# Patient Record
Sex: FEMALE | Race: WHITE | Employment: OTHER | URBAN - METROPOLITAN AREA
[De-identification: names, ages, dates, MRNs, and addresses within clinical notes are randomized per-mention and may not be internally consistent; named-entity substitution may affect disease eponyms.]

---

## 2017-04-25 ENCOUNTER — CLINICAL DOCUMENTATION (OUTPATIENT)
Dept: PHYSICAL MEDICINE AND REHAB | Age: 67
End: 2017-04-25

## 2017-05-16 ENCOUNTER — PROCEDURE VISIT (OUTPATIENT)
Dept: PHYSICAL MEDICINE AND REHAB | Age: 67
End: 2017-05-16
Payer: MEDICARE

## 2017-05-16 VITALS
HEIGHT: 60 IN | BODY MASS INDEX: 28.47 KG/M2 | WEIGHT: 145 LBS | DIASTOLIC BLOOD PRESSURE: 72 MMHG | SYSTOLIC BLOOD PRESSURE: 130 MMHG | TEMPERATURE: 97.6 F | HEART RATE: 78 BPM

## 2017-05-16 DIAGNOSIS — G81.14 SPASTIC HEMIPLEGIA AFFECTING LEFT NONDOMINANT SIDE (HCC): Primary | ICD-10-CM

## 2017-05-16 DIAGNOSIS — R26.9 GAIT ABNORMALITY: ICD-10-CM

## 2017-05-16 DIAGNOSIS — G93.40 ENCEPHALOPATHY: ICD-10-CM

## 2017-05-16 PROCEDURE — 64642 CHEMODENERV 1 EXTREMITY 1-4: CPT | Performed by: PHYSICAL MEDICINE & REHABILITATION

## 2017-05-16 PROCEDURE — 99214 OFFICE O/P EST MOD 30 MIN: CPT | Performed by: PHYSICAL MEDICINE & REHABILITATION

## 2017-05-16 PROCEDURE — 95874 GUIDE NERV DESTR NEEDLE EMG: CPT | Performed by: PHYSICAL MEDICINE & REHABILITATION

## 2017-05-16 RX ORDER — CLONAZEPAM 0.5 MG/1
0.5 TABLET ORAL 2 TIMES DAILY PRN
Qty: 180 TABLET | Refills: 0 | Status: SHIPPED | OUTPATIENT
Start: 2017-05-16 | End: 2018-09-04 | Stop reason: SDUPTHER

## 2017-06-06 ENCOUNTER — OFFICE VISIT (OUTPATIENT)
Dept: PHYSICAL MEDICINE AND REHAB | Age: 67
End: 2017-06-06
Payer: MEDICARE

## 2017-06-06 VITALS — HEART RATE: 78 BPM | DIASTOLIC BLOOD PRESSURE: 89 MMHG | TEMPERATURE: 97.9 F | SYSTOLIC BLOOD PRESSURE: 139 MMHG

## 2017-06-06 DIAGNOSIS — N31.9 NEUROGENIC BLADDER: ICD-10-CM

## 2017-06-06 DIAGNOSIS — G81.14 SPASTIC HEMIPLEGIA AFFECTING LEFT NONDOMINANT SIDE (HCC): Primary | ICD-10-CM

## 2017-06-06 DIAGNOSIS — R26.9 GAIT ABNORMALITY: ICD-10-CM

## 2017-06-06 PROCEDURE — 99214 OFFICE O/P EST MOD 30 MIN: CPT | Performed by: PHYSICAL MEDICINE & REHABILITATION

## 2017-06-12 ENCOUNTER — TELEPHONE (OUTPATIENT)
Dept: PHYSICAL MEDICINE AND REHAB | Age: 67
End: 2017-06-12

## 2017-09-09 DIAGNOSIS — I67.9 SPASTIC HEMIPLEGIA OF LEFT NONDOMINANT SIDE DUE TO CEREBROVASCULAR DISEASE, UNSPECIFIED CEREBROVASCULAR DISEASE TYPE: ICD-10-CM

## 2017-09-09 DIAGNOSIS — R26.9 GAIT ABNORMALITY: ICD-10-CM

## 2017-09-09 DIAGNOSIS — G81.14 SPASTIC HEMIPLEGIA OF LEFT NONDOMINANT SIDE DUE TO CEREBROVASCULAR DISEASE, UNSPECIFIED CEREBROVASCULAR DISEASE TYPE: ICD-10-CM

## 2017-09-09 DIAGNOSIS — G81.14 SPASTIC HEMIPLEGIA AFFECTING LEFT NONDOMINANT SIDE (HCC): ICD-10-CM

## 2017-09-11 RX ORDER — BACLOFEN 10 MG/1
TABLET ORAL
Qty: 270 TABLET | Refills: 3 | Status: SHIPPED | OUTPATIENT
Start: 2017-09-11 | End: 2018-09-01 | Stop reason: SDUPTHER

## 2017-09-11 RX ORDER — CLONAZEPAM 0.5 MG/1
0.5 TABLET ORAL 2 TIMES DAILY PRN
Qty: 180 TABLET | Refills: 3 | Status: SHIPPED | OUTPATIENT
Start: 2017-09-11 | End: 2018-03-19 | Stop reason: SDUPTHER

## 2017-10-10 ENCOUNTER — PROCEDURE VISIT (OUTPATIENT)
Dept: PHYSICAL MEDICINE AND REHAB | Age: 67
End: 2017-10-10
Payer: MEDICARE

## 2017-10-10 VITALS
WEIGHT: 140 LBS | BODY MASS INDEX: 23.9 KG/M2 | HEIGHT: 64 IN | SYSTOLIC BLOOD PRESSURE: 158 MMHG | DIASTOLIC BLOOD PRESSURE: 97 MMHG | TEMPERATURE: 97.8 F | HEART RATE: 76 BPM

## 2017-10-10 DIAGNOSIS — R26.9 GAIT ABNORMALITY: ICD-10-CM

## 2017-10-10 PROCEDURE — 95874 GUIDE NERV DESTR NEEDLE EMG: CPT | Performed by: PHYSICAL MEDICINE & REHABILITATION

## 2017-10-10 PROCEDURE — 99213 OFFICE O/P EST LOW 20 MIN: CPT | Performed by: PHYSICAL MEDICINE & REHABILITATION

## 2017-10-10 PROCEDURE — 64643 CHEMODENERV 1 EXTREM 1-4 EA: CPT | Performed by: PHYSICAL MEDICINE & REHABILITATION

## 2017-10-10 PROCEDURE — 64644 CHEMODENERV 1 EXTREM 5/> MUS: CPT | Performed by: PHYSICAL MEDICINE & REHABILITATION

## 2017-10-10 RX ORDER — NITROFURANTOIN 25; 75 MG/1; MG/1
CAPSULE ORAL
Refills: 0 | COMMUNITY
Start: 2017-08-07 | End: 2018-06-12 | Stop reason: ALTCHOICE

## 2017-10-14 NOTE — PROGRESS NOTES
received some serial casting. She continues with a left ankle-foot orthoses and a left upper extremity resting splint. She is using Botox for spasticity as well as pain management. She  on July 1, 2015 by Dr. Fredo Matta in which he used 400 units to inject the medial and lateral gastroc muscles, soleus muscle, biceps, flexor pollicis longus and flexor digitorum superficialis    Past Medical History:   Diagnosis Date    Anxiety     Coronary heart disease     Depression     Hypertension     Stroke due to intracerebral hemorrhage ([facility] [address]       History reviewed. No pertinent surgical history. Family History   Problem Relation Age of Onset    Hypertension Mother     Stroke Mother        Social History   Substance Use Topics    Smoking status: Former Smoker    Smokeless tobacco: Never Used    Alcohol use No        Current Outpatient Prescriptions   Medication Sig Dispense Refill    nitrofurantoin, macrocrystal-monohydrate, (MACROBID) 100 MG capsule take 1 capsule by mouth twice a day for 5 days  0    baclofen (LIORESAL) 10 MG tablet TAKE 1 TABLET THREE TIMES A  tablet 3    clonazePAM (KLONOPIN) 0.5 MG tablet Take 1 tablet by mouth 2 times daily as needed for Anxiety 180 tablet 3    acetaminophen (TYLENOL) 500 MG tablet Take 1,000 mg by mouth      trospium (SANCTURA) 20 MG tablet       ketoconazole (NIZORAL) 2 % shampoo Ketoconazole 2 % External ShampooApply to affected areas, lather with small amount of water. Leave on 10 mins. Rinse. Use twice a wk with at least 3 days between each use.  Quantity: 120 Refills: 0 aPtria Becerra M.D.;  Started Active      oxybutynin (DITROPAN) 5 MG tablet       alendronate (FOSAMAX) 70 MG tablet       RA VITAMIN D-3 1000 UNITS TABS tablet   0    imipramine (TOFRANIL) 25 MG tablet Take 25 mg by mouth nightly      atorvastatin (LIPITOR) 20 MG tablet Take 20 mg by mouth daily      RA STOOL SOFTENER 100 MG capsule   0    escitalopram (LEXAPRO) 10 MG tablet   0  ketoconazole (NIZORAL) 2 % shampoo   0    hydrochlorothiazide (HYDRODIURIL) 25 MG tablet Take 25 mg by mouth three times daily      lisinopril (PRINIVIL;ZESTRIL) 10 MG tablet Take 10 mg by mouth daily      clonazePAM (KLONOPIN) 0.5 MG tablet Take 1 tablet by mouth 2 times daily as needed for Anxiety (spasticity) 3 month supply 180 tablet 0    ciprofloxacin (CIPRO) 250 MG tablet Take 250 mg by mouth       No current facility-administered medications for this visit. Allergies   Allergen Reactions    Penicillins Swelling and Hives    Imipramine     Metoprolol Other (See Comments)         Subjective:      Review of Systems  CONSTITUTIONAL: Negative for fever, chills, sweat, fatigue and unexpected weight change. HEENT:  Negative for diplopia, blind spots, blurred vision, hearing loss, trouble chewing or swallowing, denies coughing while eating or drinking denies nosebleed    RESPIRATORY: Negative for wheezing, coughing or shortness of breath    CARDIOVASCULAR: Negative for chest pain, palpitations, lightheadedness  GASTROINTESTINAL: Negative for heartburn, nausea, constipation, diarrhea, abdominal pain  or incontinence  GENTIOURNIARY: Negative for dysuria, urgency, frequency, incontinence and difficulty urinating. ENDOCRINE: Negative for hot or cold intolerance, denies any significant weight change  MUSCULOSKELETAL: Negative for + focal joint pain, back pain, neck pain and arthralgias. spasticity left upper and lower extremity  NUEROLOGIIC: Negative for focal weakness, numbness, tingling, +balance loss, headaches or falls  BEHAVIOR/PSYCY: Denies depression, anxiety, memory loss or insomnia  SKIN: Denies ulcers, rashes or bruises         Objective:     Physical Exam  BP (!) 158/97   Pulse 76   Temp 97.8 °F (36.6 °C) (Tympanic)   Ht 5' 4\" (1.626 m)   Wt 140 lb (63.5 kg)   BMI 24.03 kg/m²     Nursing notes and vitals reviewed    CONSTITUTIONAL: She is oriented to person, place, and time.  She appears well-developed and well-nourished. HEENT: Pupils equal reactive to light, exact motion intact, visual fields are full, mild left facial asymmetry, speech fluent, mild dysarthria dysarthria, comprehension intact, object naming intact, repetition intact, basic cognition intact  CARDIOVASCULAR: Heart regular rate and rhythm with no murmurs rubs or gallops   PULMONARY/CHEST: Clear to auscultation with no wheezes or crackles noted  ABDOMEN: Soft, nontender with positive bowel sounds, no guarding or rebound   NEUROLOGIC:    Orientation: Alert and oriented ,    cranial nerves:  II through XII are intact,   Strength:5 out of 5 throughout Right upper and lower extremities   Sensation: Intact to light touch and pinprick to right   Balance: Impaired   Examination of left upper and lower extremities reveal left forearm- -he is able to extend her thumb and allow hygiene,- however there is increased tightness with thumb and palm she has improved range of motion of the elbow though still limited to approximately 40° of extension lag  . In the lower extremities she has increased spasticity . There is limited range of motion due to spasticity and pain. -Stiff leg. .    She uses a right hemiwalker for ambulation and she has a spastic meli-paretic pattern. Skin is intact. There is no edema, allodynia or MCP tenderness noted. There is no edema of the lower extremities noted today    EXTREMITIES: No calf tenderness, negative Homans, negative warmth, pulses are intact  SKIN: Skin is warm and dry. PSYCIATIRIC: normal mood and affect, behavior is normal. Thought content normal.         Assessment:      1. Spastic hemiplegia of left nondominant side due to infarction of brain [facility])  External Referral To Physical Therapy    MD NEEDLE EMG GUIDANCE FOR CHEMODENERVATION    MD CHEMODENERVATION 1 EXTREMITY 5 OR MORE MUSCLES    MD CHEMODENERVATION  EA ADDL 1-4 MUSCLE(S)    onabotulinumtoxin A (BOTOX) injection 300 Units   2.  Gait abnormality  External Referral To Physical Therapy    RI NEEDLE EMG GUIDANCE FOR CHEMODENERVATION    RI CHEMODENERVATION 1 EXTREMITY 5 OR MORE MUSCLES    RI CHEMODENERVATION  EA ADDL 1-4 MUSCLE(S)    onabotulinumtoxin A (BOTOX) injection 300 Units        Plan:      1..  After consent and review of risk and benefits,  Botox was injected Under sterile techniques with EMG guidance for stiff left leg with injection of 100 units into the vastus medialis and vastus lateralis-50 units each, and 150 units into the gastrocsoleus complex for total of 250 units  and 25 units were injected into the flexor pollicis longus and 25 units into the adductor pollicis. - For a total of 300 units. She tolerated the procedure well. There was no bleeding or side effects noted postprocedure. .  2. Prescription was given for resumption of physical therapy for range of motion. ,  Transfers, ambulation, etc. they note that going to [place] in 1 week and we'll continue with therapy down there. He'll contact us in approximately 2 weeks to see how well she is doing as he will not be a follow-up at that time. 3.  Regarding her pain, she continued to use Vicodin on as needed basis approximately 2-3 times a week. This helped significantly reducing her pain   4. Continue with Klonopin as needed - She'll also continue with baclofen 10 mg 2-3 times a day with occasional extra dose as needed. Zanaflex has been discontinued. 5.  Recommend continued follow with urology and neurology and fall precautions were reviewed with the patient  6. Follow-up in 3-4 months     Return in about 3 months (around 1/10/2018) for Followup.      Orders Placed This Encounter   Procedures    External Referral To Physical Therapy     Referral Priority:   Routine     Referral Type:   Eval and Treat     Referral Reason:   Specialty Services Required     Requested Specialty:   Physical Therapy     Number of Visits Requested:   1    RI NEEDLE EMG GUIDANCE FOR

## 2018-03-19 DIAGNOSIS — R26.9 GAIT ABNORMALITY: ICD-10-CM

## 2018-03-19 DIAGNOSIS — G81.14 SPASTIC HEMIPLEGIA OF LEFT NONDOMINANT SIDE DUE TO CEREBROVASCULAR DISEASE, UNSPECIFIED CEREBROVASCULAR DISEASE TYPE: ICD-10-CM

## 2018-03-19 DIAGNOSIS — I67.9 SPASTIC HEMIPLEGIA OF LEFT NONDOMINANT SIDE DUE TO CEREBROVASCULAR DISEASE, UNSPECIFIED CEREBROVASCULAR DISEASE TYPE: ICD-10-CM

## 2018-03-20 ENCOUNTER — CLINICAL DOCUMENTATION (OUTPATIENT)
Dept: PHYSICAL MEDICINE AND REHAB | Age: 68
End: 2018-03-20

## 2018-03-20 RX ORDER — CLONAZEPAM 0.5 MG/1
0.5 TABLET ORAL 2 TIMES DAILY PRN
Qty: 180 TABLET | Refills: 3 | Status: SHIPPED | OUTPATIENT
Start: 2018-03-20 | End: 2019-05-15

## 2018-06-12 ENCOUNTER — PROCEDURE VISIT (OUTPATIENT)
Dept: PHYSICAL MEDICINE AND REHAB | Age: 68
End: 2018-06-12
Payer: MEDICARE

## 2018-06-12 VITALS — DIASTOLIC BLOOD PRESSURE: 86 MMHG | TEMPERATURE: 98.1 F | SYSTOLIC BLOOD PRESSURE: 109 MMHG | HEART RATE: 98 BPM

## 2018-06-12 DIAGNOSIS — I82.402 ACUTE DEEP VEIN THROMBOSIS (DVT) OF LEFT LOWER EXTREMITY, UNSPECIFIED VEIN (HCC): ICD-10-CM

## 2018-06-12 DIAGNOSIS — R60.9 EDEMA, UNSPECIFIED TYPE: Primary | ICD-10-CM

## 2018-06-12 DIAGNOSIS — R26.9 GAIT ABNORMALITY: ICD-10-CM

## 2018-06-12 DIAGNOSIS — N31.9 NEUROGENIC BLADDER: ICD-10-CM

## 2018-06-12 DIAGNOSIS — R22.42 LOCALIZED SWELLING, MASS AND LUMP, LEFT LOWER LIMB: ICD-10-CM

## 2018-06-12 DIAGNOSIS — I63.9 CEREBROVASCULAR ACCIDENT (CVA), UNSPECIFIED MECHANISM (HCC): ICD-10-CM

## 2018-06-12 PROCEDURE — 99214 OFFICE O/P EST MOD 30 MIN: CPT | Performed by: PHYSICAL MEDICINE & REHABILITATION

## 2018-06-12 RX ORDER — LISINOPRIL 20 MG/1
20 TABLET ORAL 2 TIMES DAILY
COMMUNITY
End: 2019-05-15

## 2018-06-12 RX ORDER — ASPIRIN 325 MG
325 TABLET ORAL DAILY
COMMUNITY

## 2018-06-12 RX ORDER — FUROSEMIDE 20 MG/1
20 TABLET ORAL DAILY
COMMUNITY
End: 2019-09-05 | Stop reason: ALTCHOICE

## 2018-06-14 ENCOUNTER — HOSPITAL ENCOUNTER (OUTPATIENT)
Dept: VASCULAR LAB | Age: 68
Discharge: HOME | End: 2018-06-14
Payer: MEDICARE

## 2018-06-14 ENCOUNTER — HOSPITAL ENCOUNTER (OUTPATIENT)
Age: 68
Discharge: HOME | End: 2018-06-14
Payer: MEDICARE

## 2018-06-14 DIAGNOSIS — I63.9 CEREBROVASCULAR ACCIDENT (CVA), UNSPECIFIED MECHANISM (HCC): ICD-10-CM

## 2018-06-14 DIAGNOSIS — I82.402 ACUTE DEEP VEIN THROMBOSIS (DVT) OF LEFT LOWER EXTREMITY, UNSPECIFIED VEIN (HCC): ICD-10-CM

## 2018-06-14 DIAGNOSIS — R22.42 LOCALIZED SWELLING, MASS AND LUMP, LEFT LOWER LIMB: ICD-10-CM

## 2018-06-14 DIAGNOSIS — R60.9 EDEMA, UNSPECIFIED TYPE: ICD-10-CM

## 2018-06-14 LAB
ALBUMIN SERPL-MCNC: 4.6 G/DL (ref 3.5–5.2)
ALBUMIN/GLOBULIN RATIO: ABNORMAL (ref 1–2.5)
ALP BLD-CCNC: 128 U/L (ref 35–104)
ALT SERPL-CCNC: 15 U/L (ref 5–33)
ANION GAP SERPL CALCULATED.3IONS-SCNC: 14 MMOL/L (ref 9–17)
AST SERPL-CCNC: 20 U/L
BILIRUB SERPL-MCNC: 0.63 MG/DL (ref 0.3–1.2)
BUN BLDV-MCNC: 26 MG/DL (ref 8–23)
BUN/CREAT BLD: 30 (ref 9–20)
CALCIUM SERPL-MCNC: 10.3 MG/DL (ref 8.6–10.4)
CHLORIDE BLD-SCNC: 105 MMOL/L (ref 98–107)
CO2: 25 MMOL/L (ref 20–31)
CREAT SERPL-MCNC: 0.88 MG/DL (ref 0.5–0.9)
GFR AFRICAN AMERICAN: >60 ML/MIN
GFR NON-AFRICAN AMERICAN: >60 ML/MIN
GFR SERPL CREATININE-BSD FRML MDRD: ABNORMAL ML/MIN/{1.73_M2}
GFR SERPL CREATININE-BSD FRML MDRD: ABNORMAL ML/MIN/{1.73_M2}
GLUCOSE BLD-MCNC: 103 MG/DL (ref 70–99)
HCT VFR BLD CALC: 47.2 % (ref 36–46)
HEMOGLOBIN: 15.3 G/DL (ref 12–16)
MCH RBC QN AUTO: 30 PG (ref 26–34)
MCHC RBC AUTO-ENTMCNC: 32.5 G/DL (ref 31–37)
MCV RBC AUTO: 92.3 FL (ref 80–100)
NRBC AUTOMATED: ABNORMAL PER 100 WBC
PDW BLD-RTO: 14.6 % (ref 11.5–14.5)
PLATELET # BLD: 338 K/UL (ref 130–400)
PMV BLD AUTO: 8.2 FL (ref 6–12)
POTASSIUM SERPL-SCNC: 3.9 MMOL/L (ref 3.7–5.3)
RBC # BLD: 5.11 M/UL (ref 4–5.2)
SODIUM BLD-SCNC: 144 MMOL/L (ref 135–144)
TOTAL PROTEIN: 7.9 G/DL (ref 6.4–8.3)
WBC # BLD: 11.4 K/UL (ref 3.5–11)

## 2018-06-14 PROCEDURE — 80053 COMPREHEN METABOLIC PANEL: CPT

## 2018-06-14 PROCEDURE — 85027 COMPLETE CBC AUTOMATED: CPT

## 2018-06-14 PROCEDURE — 93970 EXTREMITY STUDY: CPT

## 2018-06-14 PROCEDURE — 36415 COLL VENOUS BLD VENIPUNCTURE: CPT

## 2018-06-15 ENCOUNTER — TELEPHONE (OUTPATIENT)
Dept: PHYSICAL MEDICINE AND REHAB | Age: 68
End: 2018-06-15

## 2018-06-15 DIAGNOSIS — R26.9 GAIT ABNORMALITY: ICD-10-CM

## 2018-09-01 DIAGNOSIS — G81.14 SPASTIC HEMIPLEGIA AFFECTING LEFT NONDOMINANT SIDE (HCC): ICD-10-CM

## 2018-09-04 DIAGNOSIS — I69.954 SPASTIC HEMIPLEGIA OF LEFT NONDOMINANT SIDE AS LATE EFFECT OF CEREBROVASCULAR DISEASE, UNSPECIFIED CEREBROVASCULAR DISEASE TYPE (HCC): ICD-10-CM

## 2018-09-04 DIAGNOSIS — R26.9 GAIT ABNORMALITY: ICD-10-CM

## 2018-09-04 RX ORDER — CLONAZEPAM 0.5 MG/1
0.5 TABLET ORAL 2 TIMES DAILY PRN
Qty: 180 TABLET | Refills: 1 | Status: SHIPPED | OUTPATIENT
Start: 2018-09-04 | End: 2018-12-31 | Stop reason: SDUPTHER

## 2018-09-04 RX ORDER — BACLOFEN 10 MG/1
TABLET ORAL
Qty: 270 TABLET | Refills: 3 | Status: SHIPPED | OUTPATIENT
Start: 2018-09-04 | End: 2018-12-31 | Stop reason: SDUPTHER

## 2018-09-18 ENCOUNTER — OFFICE VISIT (OUTPATIENT)
Dept: PHYSICAL MEDICINE AND REHAB | Age: 68
End: 2018-09-18
Payer: MEDICARE

## 2018-09-18 VITALS — SYSTOLIC BLOOD PRESSURE: 150 MMHG | HEART RATE: 78 BPM | TEMPERATURE: 97.9 F | DIASTOLIC BLOOD PRESSURE: 100 MMHG

## 2018-09-18 DIAGNOSIS — G93.40 ENCEPHALOPATHY: ICD-10-CM

## 2018-09-18 DIAGNOSIS — G81.14 SPASTIC HEMIPLEGIA AFFECTING LEFT NONDOMINANT SIDE, UNSPECIFIED ETIOLOGY (HCC): Primary | ICD-10-CM

## 2018-09-18 DIAGNOSIS — N31.9 NEUROGENIC BLADDER: ICD-10-CM

## 2018-09-18 DIAGNOSIS — R26.9 GAIT ABNORMALITY: ICD-10-CM

## 2018-09-18 PROCEDURE — 99213 OFFICE O/P EST LOW 20 MIN: CPT | Performed by: PHYSICAL MEDICINE & REHABILITATION

## 2018-09-18 NOTE — LETTER
[facility] PHYSICIANS  [facility] PHYSICAL MEDICINE AND REHABILITATION  [address]  [place] [address]  Dept: [phone]  Dept Fax: [phone]      9/22/18    Patient: Gareth Wilcox  YOB: 1950    Dear  Ruben Mohr MD ,    I had the pleasure of seeing your patient, Gareth Wilcox today in the office today. Below are the relevant portions of my assessment and plan of care. IMPRESSION:      Diagnosis Orders   1. Spastic hemiplegia affecting left nondominant side, unspecified etiology (HCC)  Hepatic Function Panel    traMADol (ULTRAM) 50 MG tablet   2. Gait abnormality     3. Neurogenic bladder     4. Encephalopathy       PLAN:     1.. We discussed consideration of Botox, Particularly for thumb and palm. They do not want pursue at this time. They will reschedule if symptoms persist.  We discussed importance of using the brace. They note that they have one. If any issues with the brace, there is notify us for new brace and possible occupational therapy. Fall precautions were reviewed. 2.  Lower extremity swellingDopplers were negative, she's been followed up with her primary care physician and cardiologist.  Medications are being adjusted. She will continue follow-up with  3.  Regarding her pain, no significant change in pain pattern. Would avoid going back to Vicodin and she has no allergies. We will try low-dose Ultramfor 1 week. Monitor effects. Diane Alcantar also recheck ALT/AST due to frequent use of Tylenol. Previous lab work from 6/14/18 was noted . They will notify if further medications are needed. 4.  Continue with Klonopin as needed - She'll also continue with baclofen 10 mg 2-3 times a day with occasional extra dose as needed. Zanaflex has been discontinued. 5.  Recommend continued follow with urology and neurology and fall precautions were reviewed with the patient  6. They're planning to go to [place] in the next few weeks.   They have physician in that area. He was advised to follow-up for medications as well as possible Botox if symptoms persist per  7. Follow-up in 3-4 months     Return in about 3 months (around 12/18/2018) for Followup. Orders Placed This Encounter   Procedures    Hepatic Function Panel     Standing Status:   Future     Standing Expiration Date:   9/18/2019             Thank you for allowing me to participate in the care of this patient. I will keep you updated on this patient's follow up and I look forward to serving you and your patients again in the future. Cb Guaman. Tony Mcdaniel MD

## 2018-09-19 RX ORDER — TRAMADOL HYDROCHLORIDE 50 MG/1
25 TABLET ORAL NIGHTLY PRN
Qty: 4 TABLET | Refills: 0 | Status: SHIPPED | OUTPATIENT
Start: 2018-09-19 | End: 2018-09-26

## 2018-09-22 NOTE — PROGRESS NOTES
[facility] PHYSICAL MEDICINE AND REHABILITATION  [address] [place] [id number]  Dept: [phone]  Dept Fax: [phone]    Outpatient Follow up Note    Matthew Forde, 70 y.o., female, presents for follow up for Botox for left spastic hemiparesis    Chief Complaint   Patient presents with    Follow-up     s/p stroke;  pt still has swollen ankle;  in a wheelchair today due to not being able to wear brace;  this is a follow-up today with dr. Elliott Sun before pt and  go to [place] for the winter. .    Patient was last seen on 6/14/18    HPI:     Other     Since last visit she's been doing fairly well. She continues with swallowing and lower extremities. Dopplers were negative. She's been seen by her primary care physician and cardiologist.  She's had increasing Lasix, EKG. .      She denies any difficulty with bowels or bladder. She denies any falls. Function is baseline. She does continue with some pain increase in pain. She has stopped using her Norco.  She notes pain to be 8 out of 10 in the left lower extremity. She denies any numbness or tingling. She notes the same pain pattern but is increased since often Norco.  She's been using Tylenol 501-2 tablets 1-2 times a day. She states is only helps a little bit. .   is very supportive and provides assistance for ADLs as well as transfers. They're putting in a stair lift. They're also planning to go back down to [place] for the winter    Regards to spasticity-they do not feel as if she has any significant increase in tone except for in her left hand. She does not want pursue Botox at this time. Previos Botox injection  for stiff left lower extremity with injection into the gastrocsoleus complex and medial/lateral vastus. (250 units) and left upper extremity 50 units was in October 2017      Venous Doppler 6/14/18  RIGHT:    No evidence of superficial or deep venous thrombosis in the lower    extremity.     LEFT:    No evidence of superficial or deep venous thrombosis in the lower    extremity. Multilevel edema noted. History  She is a 60-year-old female. 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 She actually initially had a stroke in January 2013 but had nearly full recovery. However, she then had another large stroke. She has been seeing Dr. Katherine Brown for the last 2 and half years getting Botox injection about every 3 months in the left upper and left lower extremity. They note however, that last injection there was not as much improvement as previously. She also received some serial casting. She continues with a left ankle-foot orthoses and a left upper extremity resting splint. She is using Botox for spasticity as well as pain management. She  on July 1, 2015 by Dr. Sana Li in which he used 400 units to inject the medial and lateral gastroc muscles, soleus muscle, biceps, flexor pollicis longus and flexor digitorum superficialis    Past Medical History:   Diagnosis Date    Anxiety     Coronary heart disease     Depression     Hypertension     Stroke due to intracerebral hemorrhage ([facility] [address]       History reviewed. No pertinent surgical history. Family History   Problem Relation Age of Onset    Hypertension Mother     Stroke Mother        Social History   Substance Use Topics    Smoking status: Former Smoker    Smokeless tobacco: Never Used    Alcohol use No        Current Outpatient Prescriptions   Medication Sig Dispense Refill    traMADol (ULTRAM) 50 MG tablet Take 0.5 tablets by mouth nightly as needed for Pain for up to 7 days. Intended supply: 7 days. Take lowest dose possible to manage pain.  4 tablet 0    furosemide (LASIX) 20 MG tablet Take 20 mg by mouth daily      baclofen (LIORESAL) 10 MG tablet TAKE 1 TABLET THREE TIMES A  tablet 3    clonazePAM (KLONOPIN) 0.5 MG tablet Take 1 tablet by mouth

## 2018-09-27 ENCOUNTER — TELEPHONE (OUTPATIENT)
Dept: PHYSICAL MEDICINE AND REHAB | Age: 68
End: 2018-09-27

## 2018-10-15 ENCOUNTER — TELEPHONE (OUTPATIENT)
Dept: PHYSICAL MEDICINE AND REHAB | Age: 68
End: 2018-10-15

## 2018-10-15 NOTE — TELEPHONE ENCOUNTER
Liver panel (10/11/2018 2:40 PM EDT)  Liver panel (10/11/2018 2:40 PM EDT)   Component Value Ref Range Performed At   Alkaline phosphatase 100 39 - 130 U/L [facility] LABORATORY   AST 24 0 - 41 U/L [facility]29   ALT 16 0 - 31 U/L [facility] LABORAT[facility]   Bilirubin, Total 0.6 0.3 - 1.2 mg/dL [facility]   Bilirubin, direct 0.1 0.0 - 0.4 mg/dL [facility]   Albumin 4.5 3.2 - 5.3 g/dL [facility]   Total Protein 8.1 (H) 6.0 - 8.0 g/dL [facility] LAB[facility]     Liver panel (10/11/2018 2:40 PM EDT)   Performing Organization Address City/State/Zipcode Phone Number   [facility] [address] [place], [place] [address]      Back to top of Lab Results      CBC auto differential (10/11/2018 2:36 PM EDT)  CBC auto differential (10/11/2018 2:36 PM EDT)   Component Value Ref Range Performed At   [facility] 8.7 4.0 - 11.8 [facility] 21   RBC count 5.48 (H) 3.55 - 5.20 [facility] LABORATORY   Hemoglobin 15.9 11.7 - 16.1 g/dL [facility]   Hematocrit 48.8 (H) 35 - 47 % [facility]   MCV 89 81 - 101 fL [facility]   MCH 29.1 27 - 35 pg [facility]29   MCHC 32.6 31 - 36 g/dL [facility]   RDW 14.8 (H) 11.5 - 14.7 % [facility]ATORY   Platelets 181 739 - 7353 [facility] LABORATORY   MPV 9.1 7 - 12 fL [facility] LAB[facility]ATORY   % neutrophils 74.0 % [facility]   % lymphocytes 14.5 % [facility]ORY   % monocytes 8.5 % [facility] LAB[facility]ATORY   % eosinophils 1.8 % [facility] LABORATORY   % basophils 1.2 % [facility]ORY   Neutrophils Absolute 6.4 1.5 - 6.6 X10E9/L

## 2018-10-15 NOTE — PROGRESS NOTES
Na [facility] 1729   Lymphocytes Absolute 1.3 1.0 - 3.5 [address] [address] LABORATORY   Monocytes Absolute 0.7 0 - 0.9 [address] [address] LABORATORY   Eosinophils Absolute 0.2 0.0 - 0.4 [address] [address] LABORATORY   Basophils Absolute 0.1 0 - 0.9 [address]     CBC auto differential (10/11/2018 2:36 PM EDT)   Performing Organization Address City/State/Zipcode Phone Number   [place] [facility] [address] [place], [place] [address]      Back to top of Lab Results      Basic metabolic panel (11/64/3682 2:36 PM EDT)  Basic metabolic panel (96/24/7443 2:36 PM EDT)   Component Value Ref Range Performed At   Sodium 137 134 - 146 mmol/L [facility] LABORATORY   Potassium, Bld 4.3 3.5 - 5.0 mmol/L [facility] LAB[facility]AT[facility]   Chloride 104 98 - 109 mmol/L [facility] LABORATORY   CO2 24 22 - 32 mmol/L [facility] LAB[facility]AT[facility]   Anion gap 9  Comment:   ANION GAP CALCULATION DOES NOT  INCLUDE K, NORMAL RANGES  REFLECT THIS CHANGE   4 - 12 mmol/L Na [facility] 1729   BUN 20 5 - 27 mg/dL [facility] LABORATORY   Creatinine 0.79Comment: METHOD TRACEABLE TO IDMS STANDARD 0.40 - 1.00 mg/dL [facility] LABORATORY   Glucose 86 65 - 99 mg/dL [facility] LABORAT[facility]   Calcium 10.9 (H) 8.5 - 10.5 mg/dL [facility] LAB[facility]AT[facility]   GFR MDRD Non Af Amer >60 >59 ml/min/1.73sq.m [facility] [place] [address]   GFR MDRD Af Amer >60 >59 ml/min/1.73sq.m [facility] LABORATORY     Basic metabolic panel (36/20/3425 2:36 PM EDT)   Performing Organization Address

## 2018-12-31 DIAGNOSIS — G81.14 SPASTIC HEMIPLEGIA AFFECTING LEFT NONDOMINANT SIDE, UNSPECIFIED ETIOLOGY (HCC): ICD-10-CM

## 2018-12-31 DIAGNOSIS — R26.9 GAIT ABNORMALITY: ICD-10-CM

## 2018-12-31 DIAGNOSIS — I69.954 SPASTIC HEMIPLEGIA OF LEFT NONDOMINANT SIDE AS LATE EFFECT OF CEREBROVASCULAR DISEASE, UNSPECIFIED CEREBROVASCULAR DISEASE TYPE (HCC): ICD-10-CM

## 2019-01-02 RX ORDER — CLONAZEPAM 0.5 MG/1
0.5 TABLET ORAL 2 TIMES DAILY PRN
Qty: 180 TABLET | Refills: 1 | Status: SHIPPED | OUTPATIENT
Start: 2019-01-02 | End: 2019-05-09 | Stop reason: SDUPTHER

## 2019-01-02 RX ORDER — BACLOFEN 10 MG/1
TABLET ORAL
Qty: 270 TABLET | Refills: 3 | Status: SHIPPED | OUTPATIENT
Start: 2019-01-02 | End: 2020-01-08

## 2019-01-03 ENCOUNTER — CLINICAL DOCUMENTATION (OUTPATIENT)
Dept: PHYSICAL MEDICINE AND REHAB | Age: 69
End: 2019-01-03

## 2019-01-28 ENCOUNTER — CLINICAL DOCUMENTATION (OUTPATIENT)
Dept: PHYSICAL MEDICINE AND REHAB | Age: 69
End: 2019-01-28

## 2019-04-04 LAB
ALT SERPL-CCNC: 20 U/L
AST SERPL-CCNC: 17 U/L
BASOPHILS ABSOLUTE: NORMAL /ΜL
BASOPHILS RELATIVE PERCENT: NORMAL %
EOSINOPHILS ABSOLUTE: NORMAL /ΜL
EOSINOPHILS RELATIVE PERCENT: NORMAL %
HCT VFR BLD CALC: NORMAL % (ref 36–46)
HEMOGLOBIN: NORMAL G/DL (ref 12–16)
LYMPHOCYTES ABSOLUTE: NORMAL /ΜL
LYMPHOCYTES RELATIVE PERCENT: NORMAL %
MCH RBC QN AUTO: NORMAL PG
MCHC RBC AUTO-ENTMCNC: NORMAL G/DL
MCV RBC AUTO: NORMAL FL
MONOCYTES ABSOLUTE: NORMAL /ΜL
MONOCYTES RELATIVE PERCENT: NORMAL %
NEUTROPHILS ABSOLUTE: NORMAL /ΜL
NEUTROPHILS RELATIVE PERCENT: NORMAL %
PLATELET # BLD: NORMAL K/ΜL
PMV BLD AUTO: NORMAL FL
RBC # BLD: NORMAL 10^6/ΜL
WBC # BLD: NORMAL 10^3/ML

## 2019-04-12 DIAGNOSIS — I69.954 SPASTIC HEMIPLEGIA OF LEFT NONDOMINANT SIDE AS LATE EFFECT OF CEREBROVASCULAR DISEASE, UNSPECIFIED CEREBROVASCULAR DISEASE TYPE (HCC): ICD-10-CM

## 2019-05-08 DIAGNOSIS — R26.9 GAIT ABNORMALITY: ICD-10-CM

## 2019-05-08 DIAGNOSIS — I69.954 SPASTIC HEMIPLEGIA OF LEFT NONDOMINANT SIDE AS LATE EFFECT OF CEREBROVASCULAR DISEASE, UNSPECIFIED CEREBROVASCULAR DISEASE TYPE (HCC): ICD-10-CM

## 2019-05-08 NOTE — TELEPHONE ENCOUNTER
Pt  called for refill of clonazepam. Her shipment got mailed to the [place] address and it will take about a month to reroute and get back to [facility]. She will need a one month supply sent to [facility]. She has fu appt with dr. Blanca Ocampo on 05.15.19. I called the mail order pharmacy and verified the above.

## 2019-05-09 DIAGNOSIS — R26.9 GAIT ABNORMALITY: ICD-10-CM

## 2019-05-09 DIAGNOSIS — I69.954 SPASTIC HEMIPLEGIA OF LEFT NONDOMINANT SIDE AS LATE EFFECT OF CEREBROVASCULAR DISEASE, UNSPECIFIED CEREBROVASCULAR DISEASE TYPE (HCC): ICD-10-CM

## 2019-05-09 RX ORDER — CLONAZEPAM 0.5 MG/1
0.5 TABLET ORAL 2 TIMES DAILY PRN
Qty: 60 TABLET | Refills: 0 | Status: SHIPPED | OUTPATIENT
Start: 2019-05-09 | End: 2019-07-02 | Stop reason: SDUPTHER

## 2019-05-09 RX ORDER — CLONAZEPAM 0.5 MG/1
0.5 TABLET ORAL 2 TIMES DAILY PRN
Qty: 60 TABLET | Refills: 0 | OUTPATIENT
Start: 2019-05-09 | End: 2019-06-08

## 2019-05-09 NOTE — TELEPHONE ENCOUNTER
Pt  called for refill of clonazepam. Her shipment got mailed to the [facility] address and it will take about a month to reroute and get back to [facility].       She will need a one month supply sent to [facility].     She has fu appt with dr. Jessy Chinchilla on 05.15.19.     I called the mail order pharmacy and verified the above.

## 2019-05-15 ENCOUNTER — OFFICE VISIT (OUTPATIENT)
Dept: PHYSICAL MEDICINE AND REHAB | Age: 69
End: 2019-05-15
Payer: MEDICARE

## 2019-05-15 VITALS — DIASTOLIC BLOOD PRESSURE: 104 MMHG | HEART RATE: 65 BPM | SYSTOLIC BLOOD PRESSURE: 160 MMHG | TEMPERATURE: 97.4 F

## 2019-05-15 DIAGNOSIS — R60.9 EDEMA, UNSPECIFIED TYPE: ICD-10-CM

## 2019-05-15 DIAGNOSIS — I69.954 SPASTIC HEMIPLEGIA OF LEFT NONDOMINANT SIDE AS LATE EFFECT OF CEREBROVASCULAR DISEASE, UNSPECIFIED CEREBROVASCULAR DISEASE TYPE (HCC): Primary | ICD-10-CM

## 2019-05-15 DIAGNOSIS — G93.40 ENCEPHALOPATHY: ICD-10-CM

## 2019-05-15 PROCEDURE — G8427 DOCREV CUR MEDS BY ELIG CLIN: HCPCS | Performed by: PHYSICAL MEDICINE & REHABILITATION

## 2019-05-15 PROCEDURE — 1090F PRES/ABSN URINE INCON ASSESS: CPT | Performed by: PHYSICAL MEDICINE & REHABILITATION

## 2019-05-15 PROCEDURE — 1036F TOBACCO NON-USER: CPT | Performed by: PHYSICAL MEDICINE & REHABILITATION

## 2019-05-15 PROCEDURE — 1123F ACP DISCUSS/DSCN MKR DOCD: CPT | Performed by: PHYSICAL MEDICINE & REHABILITATION

## 2019-05-15 PROCEDURE — 99214 OFFICE O/P EST MOD 30 MIN: CPT | Performed by: PHYSICAL MEDICINE & REHABILITATION

## 2019-05-15 PROCEDURE — G8400 PT W/DXA NO RESULTS DOC: HCPCS | Performed by: PHYSICAL MEDICINE & REHABILITATION

## 2019-05-15 PROCEDURE — G8421 BMI NOT CALCULATED: HCPCS | Performed by: PHYSICAL MEDICINE & REHABILITATION

## 2019-05-15 PROCEDURE — 3017F COLORECTAL CA SCREEN DOC REV: CPT | Performed by: PHYSICAL MEDICINE & REHABILITATION

## 2019-05-15 PROCEDURE — 4040F PNEUMOC VAC/ADMIN/RCVD: CPT | Performed by: PHYSICAL MEDICINE & REHABILITATION

## 2019-05-15 RX ORDER — OXYBUTYNIN CHLORIDE 5 MG/1
5 TABLET ORAL 3 TIMES DAILY
COMMUNITY
End: 2019-09-05 | Stop reason: ALTCHOICE

## 2019-05-15 NOTE — LETTER
[facility] PHYSICIANS  [facility] PHYSICAL MEDICINE AND REHABILITATION  [address]  [place] [address]  Dept: [phone]  Dept Fax: [phone]      5/17/19    Patient: Meryl Knows  YOB: 1950    Dear  Neelam Santos MD ,    I had the pleasure of seeing your patient, Meryl Knows today in the office today. Below are the relevant portions of my assessment and plan of care. IMPRESSION:      Diagnosis Orders   1. Spastic hemiplegia of left nondominant side as late effect of cerebrovascular disease, unspecified cerebrovascular disease type (HCC)  AFO Brace   2. Encephalopathy     3. Edema, unspecified type  Ultrasound doppler venous arm left    ROSA M - Georgette Huerta MD, Vascular Surgery, [address] [place] [address]:     1.. Spastic left hemiplegiaappears to have increased in the left hand and left lower extremity with some possible contractures of the hand and plantar flexion. Unable to increase baclofen due to sedationcontinue 10 mg 3 times a day. Again discussed consideration of Botox for handfinger flexors as well as for stiff leg. Also reviewed continued need for range of motion to prevent contractures. Discuss possibility of contractures are ready started in the hand and foot. There elected consider Botox. They will follow-up for injection if they would like to pursue. We also discussed possible baclofen pump. .  They will consider. He would recommendations for continued therapy. Fall precautions were reviewed. 2.  Lower extremity swelling and left upper extremity swellingDopplers LE were negative, will check venous Doppler left upper extremity. Noted evaluation by cardiology, continues on Lasix. We will refer to vascular due to continued swelling. May need consider Jobst compression stockingsawait vascular recommendations  3. Regarding her pain, she notes minimal pain, rare use of pain medication .   Lab work 4/12/19 was reviewed 4.  Continue with Klonopin as needed - She'll also continue with baclofen 10 mg 3 times a day with occasional extra dose as needed. Zanaflex has been discontinued. 5.  Recommend continued follow with urology and neurology and fall precautions were reviewed with the patient  6. Left lower extremity plantar contracturerecommend brace, noted swellingprescription for AFO orderedwill refer to orthotist and discuss with orthotist regards to brace. Discussed with patient and brace adjustment depending on edema as well as need for close monitoring of skin  7. Follow-up in 4-6 weeks     No follow-ups on file. Orders Placed This Encounter   Procedures    AFO Brace     L AFO - due to increase edema    AFL - Raissa Girard MD, Vascular Surgery, [place]     Referral Priority:   Routine     Referral Type:   Eval and Treat     Referral Reason:   Specialty Services Required     Referred to Provider:   Pacheco Akins MD     Requested Specialty:   Vascular Surgery     Number of Visits Requested:   1    Ultrasound doppler venous arm left     Standing Status:   Future     Standing Expiration Date:   7/14/2019       Thank you for allowing me to participate in the care of this patient. I will keep you updated on this patient's follow up and I look forward to serving you and your patients again in the future. Campbell Hutchison. Jaime Sun MD

## 2019-05-17 NOTE — PROGRESS NOTES
[facility] PHYSICAL MEDICINE AND REHABILITATION  [id number]  Dept: [phone]  Dept Fax: [phone]    Outpatient Follow up Note    Marysol Chino, 78 y.o., female, presents for follow up for Botox for left spastic hemiparesis    No chief complaint on file. .    Patient was last seen on September 2018    HPI:     Other     Patient last seen September 2018. Since then they've gone to [place] and has been seen by physicians there. They return for continued follow-up. Notes continued swelling the lower extremities right greater than left as well as left upper extremity. There have been  provided compression boots in [place] which they're started using. She has seen cardiology-Dr. Chelsie Ngo - felt to be of chronic venous insufficiency, recommended continue Lasix. They continue to follow with her family doctor as well. Regards to spasticity- both patient and  feel this is stable. She has not had Botox since June 2018 at which point it was done more for the left lower extremity to help with transfers and spasticity. On last visit in September 18, 2018-they did not want to pursue further Botox. She continues on baclofen 10 mg 3 times a day. Increasing dose causes sedation . She had good range of motion of the left hand the limited at the shoulder and elbow.  continues to help with transfers and ADLs. Denies any difficulty with bowels or bladder. Denies any falls. She is rarely using Norco..  Pain is fairly controlled with Tylenol as needed. They have a stair lift at home. She only does a few steps.       Last Botox was done 10/10/17- Botox was injected Under sterile techniques with EMG guidance for stiff left leg with injection of 100 units into the vastus medialis and vastus lateralis-50 units each, and 150 units into the gastrocsoleus complex for total of 250 units  and 25 units were injected into the flexor pollicis longus and 25 units into the adductor pollicis. - For a total of 300 units      Venous Doppler 6/14/18  RIGHT:    No evidence of superficial or deep venous thrombosis in the lower    extremity.  LEFT:    No evidence of superficial or deep venous thrombosis in the lower    extremity. Multilevel edema noted. History  She is a 79-year-old female. 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 She actually initially had a stroke in January 2013 but had nearly full recovery. However, she then had another large stroke. She has been seeing Dr. Ting Fuchs for the last 2 and half years getting Botox injection about every 3 months in the left upper and left lower extremity. They note however, that last injection there was not as much improvement as previously. She also received some serial casting. She continues with a left ankle-foot orthoses and a left upper extremity resting splint. She is using Botox for spasticity as well as pain management. She  on July 1, 2015 by Dr. Rafi Quick in which he used 400 units to inject the medial and lateral gastroc muscles, soleus muscle, biceps, flexor pollicis longus and flexor digitorum superficialis    Past Medical History:   Diagnosis Date    Anxiety     Coronary heart disease     Depression     Hypertension     Stroke due to intracerebral hemorrhage ([facility] [address]       History reviewed. No pertinent surgical history.     Family History   Problem Relation Age of Onset    Hypertension Mother     Stroke Mother        Social History     Tobacco Use    Smoking status: Former Smoker    Smokeless tobacco: Never Used   Substance Use Topics    Alcohol use: No     Alcohol/week: 1.8 oz     Types: 3 Standard drinks or equivalent per week        Current Outpatient Medications   Medication Sig Dispense Refill    oxybutynin (DITROPAN) 5 MG tablet Take 5 mg by mouth 3 times daily      clonazePAM (KLONOPIN) 0.5 MG tablet Take 1 (Tympanic)     Nursing notes and vitals reviewed    CONSTITUTIONAL: She is oriented to person, place, and time. She appears well-developed and well-nourished. HEENT: Pupils equal reactive to light, exact motion intact, visual fields are full, mild left facial asymmetry, speech fluent, mild dysarthria  comprehension intact, object naming intact, repetition intact, basic cognition intact  CARDIOVASCULAR: Heart regular rate and rhythm with no murmurs rubs or gallops   PULMONARY/CHEST: Clear to auscultation with no wheezes or crackles noted  ABDOMEN: Soft, nontender with positive bowel sounds, no guarding or rebound   NEUROLOGIC:    Orientation: Alert and oriented ,    cranial nerves:  II through XII are intact,   Strength:5 out of 5 throughout Right upper and lower extremities   Sensation: Intact to light touch and pinprick to right   Balance: Impaired   Examination of left upper and lower extremities reveal left forearm- -today she has increased tightness of the hand and fingers, difficulty ranging, causes discomfort on attempted range continues with decreased range of motion elbow to 40° extension lag and decreased range of motion of the shoulder with no change. Left lower extremity ankle is in approximately 20° plantarflexion contracture, there is mild ability to range. There is increased tone in left lower extremity as well. Ambulation was not attempted    EXTREMITIES: No calf tenderness, negative Homans, negative warmth, pulses are intact, 2+ edema left distal leg and 1+ edema right, no warmth or calf tenderness-no change  SKIN: Skin is warm and dry. PSYCIATIRIC: normal mood and affect, behavior is normal. Thought content normal.         Assessment:       Diagnosis Orders   1. Spastic hemiplegia of left nondominant side as late effect of cerebrovascular disease, unspecified cerebrovascular disease type (HCC)  AFO Brace   2. Encephalopathy     3.  Edema, unspecified type  Ultrasound doppler venous arm left edema    ROSA M - Ally Rodriges MD, Vascular Surgery, [place]     Referral Priority:   Routine     Referral Type:   Eval and Treat     Referral Reason:   Specialty Services Required     Referred to Provider:   Ramandeep Ontiveros MD     Requested Specialty:   Vascular Surgery     Number of Visits Requested:   1    Ultrasound doppler venous arm left     Standing Status:   Future     Standing Expiration Date:   7/14/2019              Electronically signed by Grecia Love. Jourdan Sinclair MD on 5/17/2019 at 12:56 PM    Please note that this chart was generated using voice recognition Dragon dictation software. Although every effort was made to ensure the accuracy of this automated transcription, some errors in transcription may have occurred.

## 2019-07-02 DIAGNOSIS — I69.954 SPASTIC HEMIPLEGIA OF LEFT NONDOMINANT SIDE AS LATE EFFECT OF CEREBROVASCULAR DISEASE, UNSPECIFIED CEREBROVASCULAR DISEASE TYPE (HCC): ICD-10-CM

## 2019-07-02 DIAGNOSIS — R26.9 GAIT ABNORMALITY: ICD-10-CM

## 2019-07-03 RX ORDER — CLONAZEPAM 0.5 MG/1
TABLET ORAL
Qty: 60 TABLET | Refills: 0 | Status: SHIPPED | OUTPATIENT
Start: 2019-07-03 | End: 2019-08-02 | Stop reason: SDUPTHER

## 2019-07-30 ENCOUNTER — OFFICE VISIT (OUTPATIENT)
Dept: PHYSICAL MEDICINE AND REHAB | Age: 69
End: 2019-07-30
Payer: MEDICARE

## 2019-07-30 VITALS
BODY MASS INDEX: 27.83 KG/M2 | HEART RATE: 79 BPM | SYSTOLIC BLOOD PRESSURE: 139 MMHG | TEMPERATURE: 97.9 F | WEIGHT: 163 LBS | HEIGHT: 64 IN | DIASTOLIC BLOOD PRESSURE: 86 MMHG

## 2019-07-30 DIAGNOSIS — N31.9 NEUROGENIC BLADDER: ICD-10-CM

## 2019-07-30 DIAGNOSIS — I69.954 SPASTIC HEMIPLEGIA OF LEFT NONDOMINANT SIDE AS LATE EFFECT OF CEREBROVASCULAR DISEASE, UNSPECIFIED CEREBROVASCULAR DISEASE TYPE (HCC): Primary | ICD-10-CM

## 2019-07-30 DIAGNOSIS — R26.9 GAIT ABNORMALITY: ICD-10-CM

## 2019-07-30 DIAGNOSIS — I89.0 LYMPHEDEMA: ICD-10-CM

## 2019-07-30 PROCEDURE — G8427 DOCREV CUR MEDS BY ELIG CLIN: HCPCS | Performed by: PHYSICAL MEDICINE & REHABILITATION

## 2019-07-30 PROCEDURE — 4040F PNEUMOC VAC/ADMIN/RCVD: CPT | Performed by: PHYSICAL MEDICINE & REHABILITATION

## 2019-07-30 PROCEDURE — 1036F TOBACCO NON-USER: CPT | Performed by: PHYSICAL MEDICINE & REHABILITATION

## 2019-07-30 PROCEDURE — G8419 CALC BMI OUT NRM PARAM NOF/U: HCPCS | Performed by: PHYSICAL MEDICINE & REHABILITATION

## 2019-07-30 PROCEDURE — 99214 OFFICE O/P EST MOD 30 MIN: CPT | Performed by: PHYSICAL MEDICINE & REHABILITATION

## 2019-07-30 PROCEDURE — 3017F COLORECTAL CA SCREEN DOC REV: CPT | Performed by: PHYSICAL MEDICINE & REHABILITATION

## 2019-07-30 PROCEDURE — 1090F PRES/ABSN URINE INCON ASSESS: CPT | Performed by: PHYSICAL MEDICINE & REHABILITATION

## 2019-07-30 PROCEDURE — 1123F ACP DISCUSS/DSCN MKR DOCD: CPT | Performed by: PHYSICAL MEDICINE & REHABILITATION

## 2019-07-30 PROCEDURE — G8400 PT W/DXA NO RESULTS DOC: HCPCS | Performed by: PHYSICAL MEDICINE & REHABILITATION

## 2019-07-30 NOTE — LETTER
10 mg 3 times a day with occasional extra dose as needed. Zanaflex has been discontinued. She has been on Klonopin for a many years. We discussed consideration of tapering however, they note history of seizures. They will plan to follow-up with her neurologist Dr. Janina Orozco prior to considering slow taper of Klonopin. 5.  Recommend continued follow with urology and neurology and fall precautions were reviewed with the patient  6. Left lower extremity plantar contractureprescription for AFO ordered they plan to get the brace adjusted. Impaired sacral woundsper patient and  this is healed. Pressure relief and questions discussed with patient and . 7.  Follow-up in 2 to 3 months or earlier if approved for Botox. No follow-ups on file. Orders Placed This Encounter   Procedures    CBC     Standing Status:   Future     Standing Expiration Date:   7/29/2020    Comprehensive Metabolic Panel     Standing Status:   Future     Standing Expiration Date:   7/30/2020             Thank you for allowing me to participate in the care of this patient. I will keep you updated on this patient's follow up and I look forward to serving you and your patients again in the future. Carolyn Juarez. Ariana Quick MD

## 2019-08-01 NOTE — PROGRESS NOTES
 Hypertension Mother     Stroke Mother        Social History     Tobacco Use    Smoking status: Former Smoker    Smokeless tobacco: Never Used   Substance Use Topics    Alcohol use: No     Alcohol/week: 3.0 standard drinks     Types: 3 Standard drinks or equivalent per week        Current Outpatient Medications   Medication Sig Dispense Refill    clonazePAM (KLONOPIN) 0.5 MG tablet TAKE 1 TABLET TWICE DAILY AS NEEDED FOR  ANXIETY  AND  SPASTICITY 60 tablet 0    baclofen (LIORESAL) 10 MG tablet TAKE 1 TABLET THREE TIMES A  tablet 3    furosemide (LASIX) 20 MG tablet Take 20 mg by mouth daily      aspirin 325 MG tablet Take 325 mg by mouth daily      POTASSIUM CITRATE PO Take by mouth daily      acetaminophen (TYLENOL) 500 MG tablet Take 1,000 mg by mouth      alendronate (FOSAMAX) 70 MG tablet       RA VITAMIN D-3 1000 UNITS TABS tablet   0    atorvastatin (LIPITOR) 20 MG tablet Take 20 mg by mouth daily      RA STOOL SOFTENER 100 MG capsule   0    oxybutynin (DITROPAN) 5 MG tablet Take 5 mg by mouth 3 times daily       No current facility-administered medications for this visit. Allergies   Allergen Reactions    Penicillins Swelling and Hives    Imipramine     Metoprolol Other (See Comments)         Subjective:      Review of Systems  CONSTITUTIONAL: Negative for fever, chills, sweat, fatigue and unexpected weight change. HEENT:  Negative for diplopia, blind spots, blurred vision, hearing loss, trouble chewing or swallowing, denies coughing while eating or drinking denies nosebleed    RESPIRATORY: Negative for wheezing, coughing or shortness of breath    CARDIOVASCULAR: Negative for chest pain, palpitations, lightheadedness  GASTROINTESTINAL: Negative for heartburn, nausea, constipation, diarrhea, abdominal pain  or incontinence  GENTIOURNIARY: Negative for dysuria, urgency, frequency, incontinence and difficulty urinating.    ENDOCRINE: Negative for hot or cold intolerance, denies improved, there is 1+ edema left upper extremity  SKIN: Skin is warm and dry. PSYCIATIRIC: normal mood and affect, behavior is normal. Thought content normal.         Assessment:       Diagnosis Orders   1. Spastic hemiplegia of left nondominant side as late effect of cerebrovascular disease, unspecified cerebrovascular disease type ([facility] [address]  CBC    Comprehensive Metabolic Panel   2. Gait abnormality     3. Lymphedema     4. Neurogenic bladder          Plan:      1.. Spastic left hemiplegia-l  Unable to increase baclofen due to sedation-continue 10 mg 3 times a day. Again discussed consideration of Botox for hand-finger flexors as well as for stiff leg. Also reviewed continued need for range of motion to prevent contractures. Discuss possibility of contractures . We also discussed possible baclofen pump. . They would like to pursue Botox to the left lower extremity. However they would like to clarify with insurance if this is approved. . To do home exercise program. Fall precautions were reviewed. 2.  Lower extremity swelling and left upper extremity swelling-Dopplers LE were negative, has started lymphedema clinic and compression stockings. Notes edema lower extremity has significant improved. Doppler left upper extremity during last visit, not completed. They will do hopefully this week. Advised to follow-up with vascular regarding left upper extremity as well. 3.  Regarding her pain, -she notes minimal pain, rare use of pain medication . Lab work 4/12/19 was reviewed ordered CBC/CMP to monitor medication effects to be done over the next 1 to 2 months prior to next visit  4. Continue with Klonopin as needed - She'll also continue with baclofen 10 mg 3 times a day with occasional extra dose as needed. Zanaflex has been discontinued. She has been on Klonopin for a many years. We discussed consideration of tapering however, they note history of seizures.   They will plan to follow-up with her neurologist Dr. Megan Flowers prior to considering slow taper of Klonopin. 5.  Recommend continued follow with urology and neurology and fall precautions were reviewed with the patient  6. Left lower extremity plantar contracture--prescription for AFO ordered- they plan to get the brace adjusted. Impaired sacral wounds-per patient and  this is healed. Pressure relief and questions discussed with patient and . 7.  Follow-up in 2 to 3 months or earlier if approved for Botox. No follow-ups on file. Orders Placed This Encounter   Procedures    CBC     Standing Status:   Future     Standing Expiration Date:   7/29/2020    Comprehensive Metabolic Panel     Standing Status:   Future     Standing Expiration Date:   7/30/2020              Electronically signed by Gregory Libman. Milana Ford MD on 8/1/2019 at 6:20 PM    Please note that this chart was generated using voice recognition Dragon dictation software. Although every effort was made to ensure the accuracy of this automated transcription, some errors in transcription may have occurred.

## 2019-08-02 DIAGNOSIS — R26.9 GAIT ABNORMALITY: ICD-10-CM

## 2019-08-02 DIAGNOSIS — I69.954 SPASTIC HEMIPLEGIA OF LEFT NONDOMINANT SIDE AS LATE EFFECT OF CEREBROVASCULAR DISEASE, UNSPECIFIED CEREBROVASCULAR DISEASE TYPE (HCC): ICD-10-CM

## 2019-08-02 RX ORDER — CLONAZEPAM 0.5 MG/1
TABLET ORAL
Qty: 60 TABLET | Refills: 0 | Status: SHIPPED | OUTPATIENT
Start: 2019-08-02 | End: 2019-09-06 | Stop reason: SDUPTHER

## 2019-08-19 ENCOUNTER — HOSPITAL ENCOUNTER (OUTPATIENT)
Age: 69
Discharge: HOME | End: 2019-08-19
Payer: MEDICARE

## 2019-08-19 DIAGNOSIS — I69.954 SPASTIC HEMIPLEGIA OF LEFT NONDOMINANT SIDE AS LATE EFFECT OF CEREBROVASCULAR DISEASE, UNSPECIFIED CEREBROVASCULAR DISEASE TYPE (HCC): ICD-10-CM

## 2019-08-19 LAB
ALBUMIN SERPL-MCNC: 4.3 G/DL (ref 3.5–5.2)
ALBUMIN/GLOBULIN RATIO: 1.5 (ref 1–2.5)
ALP BLD-CCNC: 110 U/L (ref 35–104)
ALT SERPL-CCNC: 15 U/L (ref 5–33)
ANION GAP SERPL CALCULATED.3IONS-SCNC: 15 MMOL/L (ref 9–17)
AST SERPL-CCNC: 16 U/L
BILIRUB SERPL-MCNC: 0.33 MG/DL (ref 0.3–1.2)
BUN BLDV-MCNC: 24 MG/DL (ref 8–23)
BUN/CREAT BLD: ABNORMAL (ref 9–20)
CALCIUM SERPL-MCNC: 10.4 MG/DL (ref 8.6–10.4)
CHLORIDE BLD-SCNC: 106 MMOL/L (ref 98–107)
CO2: 23 MMOL/L (ref 20–31)
CREAT SERPL-MCNC: 0.71 MG/DL (ref 0.5–0.9)
GFR AFRICAN AMERICAN: >60 ML/MIN
GFR NON-AFRICAN AMERICAN: >60 ML/MIN
GFR SERPL CREATININE-BSD FRML MDRD: ABNORMAL ML/MIN/{1.73_M2}
GFR SERPL CREATININE-BSD FRML MDRD: ABNORMAL ML/MIN/{1.73_M2}
GLUCOSE BLD-MCNC: 81 MG/DL (ref 70–99)
HCT VFR BLD CALC: 48.5 % (ref 36.3–47.1)
HEMOGLOBIN: 14.3 G/DL (ref 11.9–15.1)
MCH RBC QN AUTO: 28.8 PG (ref 25.2–33.5)
MCHC RBC AUTO-ENTMCNC: 29.5 G/DL (ref 28.4–34.8)
MCV RBC AUTO: 97.8 FL (ref 82.6–102.9)
NRBC AUTOMATED: 0 PER 100 WBC
PDW BLD-RTO: 14.4 % (ref 11.8–14.4)
PLATELET # BLD: 279 K/UL (ref 138–453)
PMV BLD AUTO: 10.8 FL (ref 8.1–13.5)
POTASSIUM SERPL-SCNC: 4.3 MMOL/L (ref 3.7–5.3)
RBC # BLD: 4.96 M/UL (ref 3.95–5.11)
SODIUM BLD-SCNC: 144 MMOL/L (ref 135–144)
TOTAL PROTEIN: 7.1 G/DL (ref 6.4–8.3)
WBC # BLD: 9.2 K/UL (ref 3.5–11.3)

## 2019-08-19 PROCEDURE — 80053 COMPREHEN METABOLIC PANEL: CPT

## 2019-08-19 PROCEDURE — 36415 COLL VENOUS BLD VENIPUNCTURE: CPT

## 2019-08-19 PROCEDURE — 85027 COMPLETE CBC AUTOMATED: CPT

## 2019-08-27 ENCOUNTER — PROCEDURE VISIT (OUTPATIENT)
Dept: PHYSICAL MEDICINE AND REHAB | Age: 69
End: 2019-08-27
Payer: MEDICARE

## 2019-08-27 DIAGNOSIS — G81.14 SPASTIC HEMIPLEGIA AFFECTING LEFT NONDOMINANT SIDE, UNSPECIFIED ETIOLOGY (HCC): Primary | ICD-10-CM

## 2019-08-27 PROCEDURE — 1123F ACP DISCUSS/DSCN MKR DOCD: CPT | Performed by: PHYSICAL MEDICINE & REHABILITATION

## 2019-08-27 PROCEDURE — G8428 CUR MEDS NOT DOCUMENT: HCPCS | Performed by: PHYSICAL MEDICINE & REHABILITATION

## 2019-08-27 PROCEDURE — 95874 GUIDE NERV DESTR NEEDLE EMG: CPT | Performed by: PHYSICAL MEDICINE & REHABILITATION

## 2019-08-27 PROCEDURE — 99214 OFFICE O/P EST MOD 30 MIN: CPT | Performed by: PHYSICAL MEDICINE & REHABILITATION

## 2019-08-27 PROCEDURE — G8419 CALC BMI OUT NRM PARAM NOF/U: HCPCS | Performed by: PHYSICAL MEDICINE & REHABILITATION

## 2019-08-27 PROCEDURE — 4040F PNEUMOC VAC/ADMIN/RCVD: CPT | Performed by: PHYSICAL MEDICINE & REHABILITATION

## 2019-08-27 PROCEDURE — 1036F TOBACCO NON-USER: CPT | Performed by: PHYSICAL MEDICINE & REHABILITATION

## 2019-08-27 PROCEDURE — 64642 CHEMODENERV 1 EXTREMITY 1-4: CPT | Performed by: PHYSICAL MEDICINE & REHABILITATION

## 2019-08-27 PROCEDURE — 1090F PRES/ABSN URINE INCON ASSESS: CPT | Performed by: PHYSICAL MEDICINE & REHABILITATION

## 2019-08-27 PROCEDURE — G8400 PT W/DXA NO RESULTS DOC: HCPCS | Performed by: PHYSICAL MEDICINE & REHABILITATION

## 2019-08-27 PROCEDURE — 3017F COLORECTAL CA SCREEN DOC REV: CPT | Performed by: PHYSICAL MEDICINE & REHABILITATION

## 2019-08-27 NOTE — LETTER
medication effects to be done over the next 1 to 2 months prior to next visit  4. Continue with Klonopin as needed - She'll also continue with baclofen 10 mg 3 times a day with occasional extra dose as needed. Zanaflex has been discontinued. She has been on Klonopin for a many years. We discussed consideration of tapering however, they note history of seizures. They will plan to follow-up with her neurologist Dr. Krystal Jeong prior to considering slow taper of Klonopin. 5.  Recommend continued follow with urology and neurology and fall precautions were reviewed with the patient  6. Left lower extremity plantar contracture foot orthoses adjusted  7. sacral woundsper patient and  this is healed. Pressure relief and questions discussed with patient and . 8.  Follow-up in 2 to 3 weeks or earlier . Orders Placed This Encounter   Procedures    External Referral To Physical Therapy     Referral Priority:   Routine     Referral Type:   Eval and Treat     Referral Reason:   Specialty Services Required     Requested Specialty:   Physical Therapy     Number of Visits Requested:   1             Thank you for allowing me to participate in the care of this patient. I will keep you updated on this patient's follow up and I look forward to serving you and your patients again in the future. Greg Kaplan. Uziel Friend MD

## 2019-08-28 ENCOUNTER — HOSPITAL ENCOUNTER (OUTPATIENT)
Dept: VASCULAR LAB | Age: 69
Discharge: HOME | End: 2019-08-28
Payer: MEDICARE

## 2019-08-28 PROCEDURE — 93971 EXTREMITY STUDY: CPT

## 2019-09-05 PROBLEM — E21.0 HYPERPARATHYROIDISM, PRIMARY (HCC): Status: ACTIVE | Noted: 2018-10-11

## 2019-09-05 PROBLEM — M25.562 BILATERAL CHRONIC KNEE PAIN: Status: ACTIVE | Noted: 2018-07-09

## 2019-09-05 PROBLEM — I87.2 VENOUS INSUFFICIENCY (CHRONIC) (PERIPHERAL): Status: ACTIVE | Noted: 2019-06-05

## 2019-09-05 PROBLEM — N18.2 CKD (CHRONIC KIDNEY DISEASE), STAGE II: Status: ACTIVE | Noted: 2019-05-30

## 2019-09-05 PROBLEM — L89.93 PRESSURE INJURY, STAGE 3 (HCC): Status: ACTIVE | Noted: 2019-07-03

## 2019-09-05 PROBLEM — M25.561 BILATERAL CHRONIC KNEE PAIN: Status: ACTIVE | Noted: 2018-07-09

## 2019-09-05 PROBLEM — I50.32 CHRONIC DIASTOLIC CONGESTIVE HEART FAILURE (HCC): Status: ACTIVE | Noted: 2018-08-14

## 2019-09-05 PROBLEM — G89.29 BILATERAL CHRONIC KNEE PAIN: Status: ACTIVE | Noted: 2018-07-09

## 2019-09-06 DIAGNOSIS — R26.9 GAIT ABNORMALITY: ICD-10-CM

## 2019-09-06 DIAGNOSIS — I69.954 SPASTIC HEMIPLEGIA OF LEFT NONDOMINANT SIDE AS LATE EFFECT OF CEREBROVASCULAR DISEASE, UNSPECIFIED CEREBROVASCULAR DISEASE TYPE (HCC): ICD-10-CM

## 2019-09-06 PROBLEM — H40.1234 BILATERAL LOW-TENSION GLAUCOMA, INDETERMINATE STAGE: Status: ACTIVE | Noted: 2019-09-06

## 2019-09-15 RX ORDER — CLONAZEPAM 0.5 MG/1
TABLET ORAL
Qty: 60 TABLET | Refills: 0 | Status: SHIPPED | OUTPATIENT
Start: 2019-09-15 | End: 2019-10-10 | Stop reason: SDUPTHER

## 2019-09-24 ENCOUNTER — OFFICE VISIT (OUTPATIENT)
Dept: PHYSICAL MEDICINE AND REHAB | Age: 69
End: 2019-09-24
Payer: MEDICARE

## 2019-09-24 VITALS — HEART RATE: 68 BPM | DIASTOLIC BLOOD PRESSURE: 88 MMHG | TEMPERATURE: 97.6 F | SYSTOLIC BLOOD PRESSURE: 125 MMHG

## 2019-09-24 DIAGNOSIS — I69.954 SPASTIC HEMIPLEGIA OF LEFT NONDOMINANT SIDE AS LATE EFFECT OF CEREBROVASCULAR DISEASE, UNSPECIFIED CEREBROVASCULAR DISEASE TYPE (HCC): Primary | ICD-10-CM

## 2019-09-24 DIAGNOSIS — I69.359: ICD-10-CM

## 2019-09-24 DIAGNOSIS — R60.9 EDEMA, UNSPECIFIED TYPE: ICD-10-CM

## 2019-09-24 DIAGNOSIS — N31.9 NEUROGENIC BLADDER: ICD-10-CM

## 2019-09-24 DIAGNOSIS — I89.0 LYMPHEDEMA: ICD-10-CM

## 2019-09-24 DIAGNOSIS — R26.9 GAIT ABNORMALITY: ICD-10-CM

## 2019-09-24 PROCEDURE — 1036F TOBACCO NON-USER: CPT | Performed by: PHYSICAL MEDICINE & REHABILITATION

## 2019-09-24 PROCEDURE — 1123F ACP DISCUSS/DSCN MKR DOCD: CPT | Performed by: PHYSICAL MEDICINE & REHABILITATION

## 2019-09-24 PROCEDURE — G8400 PT W/DXA NO RESULTS DOC: HCPCS | Performed by: PHYSICAL MEDICINE & REHABILITATION

## 2019-09-24 PROCEDURE — G8419 CALC BMI OUT NRM PARAM NOF/U: HCPCS | Performed by: PHYSICAL MEDICINE & REHABILITATION

## 2019-09-24 PROCEDURE — 1090F PRES/ABSN URINE INCON ASSESS: CPT | Performed by: PHYSICAL MEDICINE & REHABILITATION

## 2019-09-24 PROCEDURE — 99214 OFFICE O/P EST MOD 30 MIN: CPT | Performed by: PHYSICAL MEDICINE & REHABILITATION

## 2019-09-24 PROCEDURE — 4040F PNEUMOC VAC/ADMIN/RCVD: CPT | Performed by: PHYSICAL MEDICINE & REHABILITATION

## 2019-09-24 PROCEDURE — 3017F COLORECTAL CA SCREEN DOC REV: CPT | Performed by: PHYSICAL MEDICINE & REHABILITATION

## 2019-09-24 PROCEDURE — G8427 DOCREV CUR MEDS BY ELIG CLIN: HCPCS | Performed by: PHYSICAL MEDICINE & REHABILITATION

## 2019-09-24 RX ORDER — TROSPIUM CHLORIDE 20 MG/1
20 TABLET, FILM COATED ORAL 2 TIMES DAILY
COMMUNITY
End: 2022-05-26

## 2019-09-24 RX ORDER — LATANOPROST 50 UG/ML
SOLUTION/ DROPS OPHTHALMIC
Refills: 0 | COMMUNITY
Start: 2019-09-05 | End: 2021-07-06

## 2019-09-25 NOTE — PROGRESS NOTES
Diagnosis Date    Anxiety     Coronary heart disease     Depression     Hypertension     Stroke due to intracerebral hemorrhage (HCC)       Past Surgical History:   Procedure Laterality Date    OTHER SURGICAL HISTORY      botox injections from dr. Artur Gant every 3 months       Family History   Problem Relation Age of Onset    Hypertension Mother     Stroke Mother        Social History     Tobacco Use    Smoking status: Former Smoker     Packs/day: 0.50     Years: 15.00     Pack years: 7.50     Types: Cigarettes     Last attempt to quit: 2013     Years since quittin.7    Smokeless tobacco: Never Used   Substance Use Topics    Alcohol use: No     Alcohol/week: 3.0 standard drinks     Types: 3 Standard drinks or equivalent per week        Current Outpatient Medications   Medication Sig Dispense Refill    trospium (SANCTURA) 20 MG tablet Take 20 mg by mouth 2 times daily      latanoprost (XALATAN) 0.005 % ophthalmic solution place 1 drop into both eyes at bedtime  0    NONFORMULARY Pt.  reports using CBD OIL for pt pain      clonazePAM (KLONOPIN) 0.5 MG tablet TAKE 1 TABLET TWICE DAILY AS NEEDED FOR  ANXIETY  AND  SPASTICITY 60 tablet 0    lisinopril (PRINIVIL;ZESTRIL) 10 MG tablet Take 10 mg by mouth daily      baclofen (LIORESAL) 10 MG tablet TAKE 1 TABLET THREE TIMES A  tablet 3    aspirin 325 MG tablet Take 325 mg by mouth daily      acetaminophen (TYLENOL) 500 MG tablet Take 1,000 mg by mouth      alendronate (FOSAMAX) 70 MG tablet       RA VITAMIN D-3 1000 UNITS TABS tablet   0    atorvastatin (LIPITOR) 20 MG tablet Take 20 mg by mouth daily       No current facility-administered medications for this visit. Allergies   Allergen Reactions    Penicillins Swelling and Hives    Imipramine     Metoprolol Other (See Comments)         Subjective:      Review of Systems  CONSTITUTIONAL: Negative for fever, chills, sweat, fatigue and unexpected weight change. HEENT:  Negative for diplopia, blind spots, blurred vision, hearing loss, trouble chewing or swallowing, denies coughing while eating or drinking denies nosebleed    RESPIRATORY: Negative for wheezing, coughing or shortness of breath    CARDIOVASCULAR: Negative for chest pain, palpitations, lightheadedness  GASTROINTESTINAL: Negative for heartburn, nausea, constipation, diarrhea, abdominal pain  or incontinence  GENTIOURNIARY: Negative for dysuria, urgency, frequency, incontinence and difficulty urinating. ENDOCRINE: Negative for hot or cold intolerance, denies any significant weight change  MUSCULOSKELETAL: Negative for , back pain, neck pain and arthralgias. spasticity left upper and lower extremity-  NUEROLOGIIC: Negative for focal weakness, numbness, tingling, +balance loss, headaches or falls  BEHAVIOR/PSYCY: Denies depression, anxiety, memory loss or insomnia  SKIN: Denies ulcers, rashes or bruises         Objective:     Physical Exam  /88   Pulse 68   Temp 97.6 °F (36.4 °C) (Tympanic)     Nursing notes and vitals reviewed    CONSTITUTIONAL: She is oriented to person, place, and time. She appears well-developed and well-nourished.     HEENT: Pupils equal reactive to light, exact motion intact, visual fields are full, mild left facial asymmetry, speech fluent, mild dysarthria  comprehension intact, object naming intact, repetition intact, basic cognition intact  CARDIOVASCULAR: Heart regular rate and rhythm with no murmurs rubs or gallops   PULMONARY/CHEST: Clear to auscultation with no wheezes or crackles noted  ABDOMEN: Soft, nontender with positive bowel sounds, no guarding or rebound   NEUROLOGIC:    Orientation: Alert and oriented ,    cranial nerves:  II through XII are intact,   Strength:5 out of 5 throughout Right upper and lower extremities   Sensation: Intact to light touch and pinprick to right   Balance: Impaired   Examination of left upper and lower extremities reveal left forearm- -tightness of the hand and fingers, difficulty ranging fingers and thumb, difficulty ranging,  with decreased range of motion elbow to 40° extension lag and decreased range of motion of the shoulder with no change. Left lower extremity ankle is in approximately 20° plantarflexion contracture, there is some increased range of motion the ankle. There is improved range of motion of the knee with some decreased tone ambulation was not attempted    EXTREMITIES: No calf tenderness, negative Homans, negative warmth, pulses are intact, edema of the lower extremities has significant improved, there is 1+ edema left upper extremity  SKIN: Skin is warm and dry. PSYCIATIRIC: normal mood and affect, behavior is normal. Thought content normal.         Assessment:       Diagnosis Orders   1. Spastic hemiplegia of left nondominant side as late effect of cerebrovascular disease, unspecified cerebrovascular disease type ([facility] [address]     2. Neurogenic bladder     3. Edema, unspecified type     4. Gait abnormality     5. Lymphedema     6. Hemiplegia of nondominant side due to old stroke [facility])          Plan:      1.. Spastic left hemiplegia-Unable to increase baclofen due to sedation-continue 10 mg 3 times a day. Status post Botox injection for left lower extremity -200 units of Botox A were injected for stiff left lower extremity under EMG guidance under sterile techniques. 50 units each into the vastus medialis and vastus lateralis and 100 units of the gastrocsoleus complex for a total of 200 units- on 9/8/19 she continues with physical therapy for range of motion. She notes significant improvement in pain, transfers, ambulation. She is pleased with progress. .   . We again discussed possible baclofen pump. . She does not want to pursue a pump at this time. Fall precautions were reviewed. 2.  Left upper extremity contracture/spasticity with difficulty ranging finger to thumb and elbow.   We discussed considering Botox for the left

## 2019-10-10 DIAGNOSIS — R26.9 GAIT ABNORMALITY: ICD-10-CM

## 2019-10-10 DIAGNOSIS — I69.954 SPASTIC HEMIPLEGIA OF LEFT NONDOMINANT SIDE AS LATE EFFECT OF CEREBROVASCULAR DISEASE, UNSPECIFIED CEREBROVASCULAR DISEASE TYPE (HCC): ICD-10-CM

## 2019-10-10 RX ORDER — CLONAZEPAM 0.5 MG/1
TABLET ORAL
Qty: 60 TABLET | Refills: 0 | Status: SHIPPED | OUTPATIENT
Start: 2019-10-10 | End: 2019-11-24 | Stop reason: SDUPTHER

## 2019-11-24 DIAGNOSIS — I69.954 SPASTIC HEMIPLEGIA OF LEFT NONDOMINANT SIDE AS LATE EFFECT OF CEREBROVASCULAR DISEASE, UNSPECIFIED CEREBROVASCULAR DISEASE TYPE (HCC): ICD-10-CM

## 2019-11-24 DIAGNOSIS — R26.9 GAIT ABNORMALITY: ICD-10-CM

## 2019-11-25 RX ORDER — CLONAZEPAM 0.5 MG/1
TABLET ORAL
Qty: 60 TABLET | Refills: 0 | Status: SHIPPED | OUTPATIENT
Start: 2019-11-25 | End: 2020-01-08

## 2020-01-08 RX ORDER — BACLOFEN 10 MG/1
TABLET ORAL
Qty: 270 TABLET | Refills: 0 | Status: SHIPPED | OUTPATIENT
Start: 2020-01-08 | End: 2020-02-20

## 2020-01-08 RX ORDER — CLONAZEPAM 0.5 MG/1
TABLET ORAL
Qty: 180 TABLET | Refills: 0 | Status: SHIPPED | OUTPATIENT
Start: 2020-01-08 | End: 2020-04-20 | Stop reason: SDUPTHER

## 2020-01-08 NOTE — TELEPHONE ENCOUNTER
Pharmacy, mail order, sent request for refill of baclofen, 3 month supply    Pt last saw dr. Kaye Joel in September and will be seeing him again in spring sometime when they return from [facility]

## 2020-02-20 RX ORDER — BACLOFEN 10 MG/1
TABLET ORAL
Qty: 270 TABLET | Refills: 0 | Status: SHIPPED | OUTPATIENT
Start: 2020-02-20 | End: 2020-04-20

## 2020-04-20 RX ORDER — CLONAZEPAM 0.5 MG/1
0.5 TABLET ORAL 2 TIMES DAILY PRN
Qty: 180 TABLET | Refills: 0 | Status: SHIPPED | OUTPATIENT
Start: 2020-04-20 | End: 2021-03-29 | Stop reason: SDUPTHER

## 2020-04-20 RX ORDER — BACLOFEN 10 MG/1
TABLET ORAL
Qty: 270 TABLET | Refills: 0 | Status: SHIPPED | OUTPATIENT
Start: 2020-04-20 | End: 2020-07-10

## 2020-07-09 NOTE — TELEPHONE ENCOUNTER
Pharmacy sent request for refill of baclofen for patient    We do not have fu scheduled yet but she was last seen 09.2019. Will need to make phone visit soon with dr. Tricia Cleveland.   Pt cannot due video

## 2020-07-10 RX ORDER — BACLOFEN 10 MG/1
TABLET ORAL
Qty: 270 TABLET | Refills: 0 | Status: SHIPPED | OUTPATIENT
Start: 2020-07-10 | End: 2020-09-17

## 2020-07-10 NOTE — TELEPHONE ENCOUNTER
Pt  notified.   Will call him back to make fu appt for his wife soon and labs order mailed to be completed with a couple of weeks

## 2020-08-21 ENCOUNTER — HOSPITAL ENCOUNTER (OUTPATIENT)
Age: 70
Setting detail: SPECIMEN
Discharge: HOME | End: 2020-08-21
Payer: MEDICARE

## 2020-08-21 LAB
ALBUMIN SERPL-MCNC: 4.3 G/DL (ref 3.5–5.2)
ALBUMIN/GLOBULIN RATIO: 1.6 (ref 1–2.5)
ALP BLD-CCNC: 120 U/L (ref 35–104)
ALT SERPL-CCNC: 19 U/L (ref 5–33)
ANION GAP SERPL CALCULATED.3IONS-SCNC: 15 MMOL/L (ref 9–17)
AST SERPL-CCNC: 21 U/L
BILIRUB SERPL-MCNC: 0.67 MG/DL (ref 0.3–1.2)
BUN BLDV-MCNC: 16 MG/DL (ref 8–23)
BUN/CREAT BLD: ABNORMAL (ref 9–20)
CALCIUM SERPL-MCNC: 10.7 MG/DL (ref 8.6–10.4)
CHLORIDE BLD-SCNC: 104 MMOL/L (ref 98–107)
CO2: 24 MMOL/L (ref 20–31)
CREAT SERPL-MCNC: 0.9 MG/DL (ref 0.5–0.9)
GFR AFRICAN AMERICAN: >60 ML/MIN
GFR NON-AFRICAN AMERICAN: >60 ML/MIN
GFR SERPL CREATININE-BSD FRML MDRD: ABNORMAL ML/MIN/{1.73_M2}
GFR SERPL CREATININE-BSD FRML MDRD: ABNORMAL ML/MIN/{1.73_M2}
GLUCOSE BLD-MCNC: 100 MG/DL (ref 70–99)
HCT VFR BLD CALC: 50.6 % (ref 36.3–47.1)
HEMOGLOBIN: 15.5 G/DL (ref 11.9–15.1)
MCH RBC QN AUTO: 30 PG (ref 25.2–33.5)
MCHC RBC AUTO-ENTMCNC: 30.6 G/DL (ref 28.4–34.8)
MCV RBC AUTO: 97.9 FL (ref 82.6–102.9)
NRBC AUTOMATED: 0 PER 100 WBC
PDW BLD-RTO: 13.9 % (ref 11.8–14.4)
PLATELET # BLD: 326 K/UL (ref 138–453)
PMV BLD AUTO: 10.7 FL (ref 8.1–13.5)
POTASSIUM SERPL-SCNC: 4.6 MMOL/L (ref 3.7–5.3)
RBC # BLD: 5.17 M/UL (ref 3.95–5.11)
SODIUM BLD-SCNC: 143 MMOL/L (ref 135–144)
TOTAL PROTEIN: 7 G/DL (ref 6.4–8.3)
WBC # BLD: 10.5 K/UL (ref 3.5–11.3)

## 2020-09-17 RX ORDER — BACLOFEN 10 MG/1
TABLET ORAL
Qty: 270 TABLET | Refills: 3 | Status: SHIPPED | OUTPATIENT
Start: 2020-09-17 | End: 2021-09-20

## 2020-09-17 NOTE — TELEPHONE ENCOUNTER
Pharmacy sent request for refill of baclofen. 3 month supply for the mail order pharmacy with 3 refills    She was last seen by dr. Luna Read 09.2019. She has fu appt scheduled with Dr. Sugey Cabrera on 09.24.2020. She is being seen just before she and  go to [place] for the next 6-9 months. I will put the refills on the order entered to account for this time frame. She used to get Botox but  states that they no longer want this. Doesn't feel that it is helping any longer.   Will discuss at the appt

## 2020-09-24 ENCOUNTER — OFFICE VISIT (OUTPATIENT)
Dept: PHYSICAL MEDICINE AND REHAB | Age: 70
End: 2020-09-24
Payer: MEDICARE

## 2020-09-24 VITALS — DIASTOLIC BLOOD PRESSURE: 110 MMHG | SYSTOLIC BLOOD PRESSURE: 170 MMHG | HEART RATE: 83 BPM | TEMPERATURE: 97.8 F

## 2020-09-24 PROCEDURE — 99214 OFFICE O/P EST MOD 30 MIN: CPT | Performed by: STUDENT IN AN ORGANIZED HEALTH CARE EDUCATION/TRAINING PROGRAM

## 2020-09-24 NOTE — PROGRESS NOTES
[facility]  [facility] PHYSICAL MEDICINE AND REHABILITATION  [address] [address]  Dept: [phone]  Dept Fax: [phone]    Outpatient Followup Note    Leny Santa, 69 y.o., female, presents for follow up of impairments secondary to stroke. HPI:     Previous History:  Ms. Marian Casanova initially had a stroke in January 2013 but had a nearly full recovery afterward. She then had another large stroke in March 2013 with residual left spastic hemiparesis. She completed her initial stroke rehab at [facility].  She had been following with Dr. Florentin Carroll for Botox injections and then Dr. Faraz Castro. She is also on baclofen for spasticity. She has had serial casting for the left elbow in the past.    She has a left AFO and left upper limb resting splint. Her  assists with ADLs and transfers. She also has a stair lift at home. She follows with Dr. Tex Caballero for neurogenic bladder. She and her  live in [place] for the winter months and have physicians that they follow up with there. Interval History:  She presents with her  today, who helps to provide the history. She was last seen by Dr. Faraz Castro on 9/24/2019 for follow up after botulinum toxin injections. Since that time, she has not wanted to pursue further Botox injections, as she is not sure if it was still helping. She continues to take baclofen 10mg TID. With increased baclofen dose in the past, she became too sedated. She continues to have spasticity and some muscle spasms in the left upper and lower limbs, but she denies any interference with daily activities at this time. She does have some pain related to the spasticity, which is improved with baclofen, klonopin, CBD oil, and over-the-counter tylenol. She is not currently using the CBD oil as she ran out and has not gotten any more. In regard to the klonopin, she reports that she was initially started on this medication for seizures.   She had been working on weaning off the klonopin with Dr. Doinicio Tapia and had gotten off of it in the spring of this year. Shortly after that, she reports having a generalized tonic-clonic seizure, so they went back to the dose she had been taking prior to stopping this medication. She is now on klonopin 0.25mg daily and has not had any seizures since restarting it. She reports that she has had the left AFO for about 6 years and wears it when she is able to but she has lymphedema in the bilateral lower limbs which prevents this sometimes. She has not been able to wear the AFO recently. She denies any skin issues when she is able to wear the brace and states that is is in good condition. She has been wearing the left upper limb orthosis, which is also in good condition and not causing any skin breakdown. She has followed with the lymphedema clinic in the past and has velcro wraps and a pump at home that she uses. She also does lymphedema massage at home. She continues to take trospium for urinary incontinence and reports about 1 episode of incontinence per week at night. She states that this is her baseline. She just finished a course of antibiotics for UTI. She has been using a meli-walker at home. Her  continues to help with transfers and ADLs. She sometimes uses a transport wheelchair in the community. She denies any falls. She is not currently involved in any therapies.       Past Medical History:   Diagnosis Date    Anxiety     Coronary heart disease     Depression     Hypertension     Stroke due to intracerebral hemorrhage [facility])       Past Surgical History:   Procedure Laterality Date    OTHER SURGICAL HISTORY      botox injections from dr. Stevens Feeling every 3 months     Family History   Problem Relation Age of Onset    Hypertension Mother     Stroke Mother      Social History     Socioeconomic History    Marital status:      Spouse name: None    Number of children: None    Years of education: None    Highest education level: None   Occupational History    Occupation: DISABLED   Social Needs    Financial resource strain: None    Food insecurity     Worry: None     Inability: None    Transportation needs     Medical: None     Non-medical: None   Tobacco Use    Smoking status: Former Smoker     Packs/day: 0.50     Years: 15.00     Pack years: 7.50     Types: Cigarettes     Last attempt to quit: 2013     Years since quittin.7    Smokeless tobacco: Never Used   Substance and Sexual Activity    Alcohol use: No     Alcohol/week: 3.0 standard drinks     Types: 3 Standard drinks or equivalent per week    Drug use: No    Sexual activity: None     Comment: not asked   Lifestyle    Physical activity     Days per week: None     Minutes per session: None    Stress: None   Relationships    Social connections     Talks on phone: None     Gets together: None     Attends Hindu service: None     Active member of club or organization: None     Attends meetings of clubs or organizations: None     Relationship status: None    Intimate partner violence     Fear of current or ex partner: None     Emotionally abused: None     Physically abused: None     Forced sexual activity: None   Other Topics Concern    None   Social History Narrative    None       Current Outpatient Medications   Medication Sig Dispense Refill    atorvastatin (LIPITOR) 20 MG tablet TAKE 1 TABLET EVERY DAY 90 tablet 2    lisinopril (PRINIVIL;ZESTRIL) 10 MG tablet TAKE 1 TABLET EVERY DAY 90 tablet 2    baclofen (LIORESAL) 10 MG tablet TAKE 1 TABLET THREE TIMES DAILY 270 tablet 3    trospium (SANCTURA) 20 MG tablet Take 20 mg by mouth 2 times daily      latanoprost (XALATAN) 0.005 % ophthalmic solution place 1 drop into both eyes at bedtime  0    NONFORMULARY Pt.   reports using CBD OIL for pt pain      aspirin 325 MG tablet Take 325 mg by mouth daily      acetaminophen (TYLENOL) 500 MG tablet Take 1,000 mg by mouth      clonazePAM (KLONOPIN) 0.5 MG tablet Take 1 tablet by mouth 2 times daily as needed for Anxiety (spasticity) for up to 90 days. 180 tablet 0     No current facility-administered medications for this visit. Allergies   Allergen Reactions    Penicillins Swelling and Hives    Imipramine     Metoprolol Other (See Comments)     Drops blood pressure too low       Subjective:      Review of Systems  Constitutional: Negative for fever, chills   Genitourinary: +urinary incontinence  Musculoskeletal: +left knee pain   Neurological: +left hemiparesis, spasticity     Objective:     Physical Exam  BP (!) 170/110   Pulse 83   Temp 97.8 °F (36.6 °C)      Constitutional: She appears well-developed and well-nourished. In no distress. HEENT: NCAT, EOMI. Mucous membranes pink and moist.  Pulmonary/Chest: Respirations WNL and unlabored. MSK: Decreased AROM in the left upper and lower limbs due to spasticity and weakness. Left hemiparesis - able to provide some resistance with left elbow flexion and left knee extension; no movement noted at the left wrist, fingers, or ankle. Strength 5/5 in the right upper and lower limbs. Neurological: She is alert. Right facial droop. Sensation to light touch intact throughout all limbs. Spasticity present in the left upper and lower limbs - able to passively extend the left elbow with some resistance, difficult to passively extend left wrist and fingers, able to passively dorsiflex the left ankle with some resistance. Deferred gait evaluation today as patient did not bring meli-walker. Skin: Skin is warm dry and intact with good turgor. Psychiatric: She has a normal mood and affect. Her behavior is normal. Thought content normal.     Nursing note and vitals reviewed. CBC and CMP done on 8/21/2020 reviewed. Assessment:       Diagnosis Orders   1.  Spastic hemiplegia of left nondominant side as late effect of cerebrovascular disease, unspecified cerebrovascular disease type ([facility] [address]     2. Gait abnormality     3. Neurogenic bladder     4. Lymphedema          Plan:        1. Left spastic hemiparesis, pain - Continue baclofen 10mg TID (recent CBC and CMP reviewed, stable). Patient is not interested in pursuing further botulinum toxin injections at this time. Continue using left upper limb orthosis daily. May use over-the-counter tylenol as needed for pain. 2.  Impaired mobility, gait disturbance - Continue using left AFO when able in the setting of lymphedema. Discussed getting a larger brace to accommodate lymphedema - she will consider this if needed. Continue using meli-walker for ambulation at home. 3.  Neurogenic bladder - Follows with Dr. Pili Cook and symptoms are stable on trospium. Recently treated with antibiotics for UTI. 4.  Lymphedema - Continue home treatments given by lymphedema clinic. If edema is not improving, recommend follow up with PCP in [place]. 4.  Continue klonopin 0.25mg daily. Patient has been stable on this dose without any seizures since spring of this year and this is a lower dose than she was previously taking. Patient's blood pressure noted to be elevated in the office today at 170/110. Recheck still elevated at 170/100. Advised patient to recheck blood pressure at home and seek medical assistance if blood pressure remains elevated. She and her  expressed understanding. Return in about 1 year (around 9/24/2021).        Electronically signed by Alanis Deras MD

## 2021-03-08 ENCOUNTER — TELEPHONE (OUTPATIENT)
Dept: PHYSICAL MEDICINE AND REHAB | Age: 71
End: 2021-03-08

## 2021-03-08 NOTE — TELEPHONE ENCOUNTER
Pt will be coming back from [place] the end of April beginning of May. Made fu appt on 05.06.21.    Just before gume pt suffered a left femur fx. Is currently seeing a dr at the [address] [place] [address] in [place] for this. No surgery. Is now wearing a brace on the leg.  will ask them to send lab work, xray reports, and anything else to you and her pcp up here.

## 2021-03-08 NOTE — TELEPHONE ENCOUNTER
Ok, pt will need f/u with ortho for her fracture. Would recommend setting this up as well. Defer to PCP who she prefers. Can give list of [facility] ortho physicians as well.

## 2021-03-29 DIAGNOSIS — I69.954 SPASTIC HEMIPLEGIA OF LEFT NONDOMINANT SIDE AS LATE EFFECT OF CEREBROVASCULAR DISEASE, UNSPECIFIED CEREBROVASCULAR DISEASE TYPE (HCC): ICD-10-CM

## 2021-03-29 DIAGNOSIS — R26.9 GAIT ABNORMALITY: ICD-10-CM

## 2021-03-29 RX ORDER — CLONAZEPAM 0.5 MG/1
0.5 TABLET ORAL 2 TIMES DAILY PRN
Qty: 180 TABLET | Refills: 0 | Status: SHIPPED | OUTPATIENT
Start: 2021-03-29 | End: 2022-05-26

## 2021-03-29 NOTE — TELEPHONE ENCOUNTER
Pt's mail in pharmacy sent refill request for clonazepam 0.5 mg. Patient is in [facility] but has appt with you on 5/6 which we might have to r/s due to your schedule. Will r/s as needed.

## 2021-08-23 ENCOUNTER — HOSPITAL ENCOUNTER (OUTPATIENT)
Dept: GENERAL RADIOLOGY | Facility: CLINIC | Age: 71
Discharge: HOME | End: 2021-08-25
Payer: MEDICARE

## 2021-08-23 ENCOUNTER — HOSPITAL ENCOUNTER (OUTPATIENT)
Dept: MAMMOGRAPHY | Facility: CLINIC | Age: 71
Discharge: HOME | End: 2021-08-25
Payer: MEDICARE

## 2021-08-23 ENCOUNTER — HOSPITAL ENCOUNTER (OUTPATIENT)
Age: 71
Setting detail: SPECIMEN
Discharge: HOME | End: 2021-08-23
Payer: MEDICARE

## 2021-08-23 DIAGNOSIS — Z78.0 POSTMENOPAUSAL: ICD-10-CM

## 2021-08-23 DIAGNOSIS — Z87.81 HX OF FRACTURE OF FEMUR: ICD-10-CM

## 2021-08-23 DIAGNOSIS — E55.9 VITAMIN D DEFICIENCY: ICD-10-CM

## 2021-08-23 DIAGNOSIS — E53.8 VITAMIN B12 DEFICIENCY: ICD-10-CM

## 2021-08-23 DIAGNOSIS — S82.102D CLOSED FRACTURE OF PROXIMAL END OF LEFT TIBIA WITH ROUTINE HEALING, UNSPECIFIED FRACTURE MORPHOLOGY, SUBSEQUENT ENCOUNTER: ICD-10-CM

## 2021-08-23 DIAGNOSIS — E07.9 THYROID DISORDER: ICD-10-CM

## 2021-08-23 DIAGNOSIS — M80.00XA PATHOLOGICAL FRACTURE DUE TO AGE-RELATED OSTEOPOROSIS, UNSPECIFIED FRACTURE SITE, INITIAL ENCOUNTER: ICD-10-CM

## 2021-08-23 DIAGNOSIS — I10 HYPERTENSION, UNSPECIFIED TYPE: ICD-10-CM

## 2021-08-23 DIAGNOSIS — E78.49 OTHER HYPERLIPIDEMIA: ICD-10-CM

## 2021-08-23 DIAGNOSIS — R73.01 IFG (IMPAIRED FASTING GLUCOSE): ICD-10-CM

## 2021-08-23 LAB
ABSOLUTE EOS #: 0.31 K/UL (ref 0–0.44)
ABSOLUTE IMMATURE GRANULOCYTE: <0.03 K/UL (ref 0–0.3)
ABSOLUTE LYMPH #: 1.95 K/UL (ref 1.1–3.7)
ABSOLUTE MONO #: 0.85 K/UL (ref 0.1–1.2)
ALBUMIN SERPL-MCNC: 4.7 G/DL (ref 3.5–5.2)
ALBUMIN/GLOBULIN RATIO: 1.7 (ref 1–2.5)
ALP BLD-CCNC: 165 U/L (ref 35–104)
ALT SERPL-CCNC: 16 U/L (ref 5–33)
ANION GAP SERPL CALCULATED.3IONS-SCNC: 20 MMOL/L (ref 9–17)
AST SERPL-CCNC: 24 U/L
BASOPHILS # BLD: 1 % (ref 0–2)
BASOPHILS ABSOLUTE: 0.07 K/UL (ref 0–0.2)
BILIRUB SERPL-MCNC: 0.36 MG/DL (ref 0.3–1.2)
BUN BLDV-MCNC: 18 MG/DL (ref 8–23)
BUN/CREAT BLD: ABNORMAL (ref 9–20)
CALCIUM SERPL-MCNC: 11 MG/DL (ref 8.6–10.4)
CHLORIDE BLD-SCNC: 105 MMOL/L (ref 98–107)
CHOLESTEROL/HDL RATIO: 4.3
CHOLESTEROL: 138 MG/DL
CO2: 20 MMOL/L (ref 20–31)
CREAT SERPL-MCNC: 0.75 MG/DL (ref 0.5–0.9)
DIFFERENTIAL TYPE: ABNORMAL
EOSINOPHILS RELATIVE PERCENT: 3 % (ref 1–4)
GFR AFRICAN AMERICAN: >60 ML/MIN
GFR NON-AFRICAN AMERICAN: >60 ML/MIN
GFR SERPL CREATININE-BSD FRML MDRD: ABNORMAL ML/MIN/{1.73_M2}
GFR SERPL CREATININE-BSD FRML MDRD: ABNORMAL ML/MIN/{1.73_M2}
GLUCOSE FASTING: 82 MG/DL (ref 70–99)
HCT VFR BLD CALC: 53.4 % (ref 36.3–47.1)
HDLC SERPL-MCNC: 32 MG/DL
HEMOGLOBIN: 15.6 G/DL (ref 11.9–15.1)
IMMATURE GRANULOCYTES: 0 %
LDL CHOLESTEROL: 70 MG/DL (ref 0–130)
LYMPHOCYTES # BLD: 19 % (ref 24–43)
MCH RBC QN AUTO: 27.1 PG (ref 25.2–33.5)
MCHC RBC AUTO-ENTMCNC: 29.2 G/DL (ref 28.4–34.8)
MCV RBC AUTO: 92.7 FL (ref 82.6–102.9)
MONOCYTES # BLD: 8 % (ref 3–12)
NRBC AUTOMATED: 0 PER 100 WBC
PDW BLD-RTO: 15.1 % (ref 11.8–14.4)
PLATELET # BLD: 371 K/UL (ref 138–453)
PLATELET ESTIMATE: ABNORMAL
PMV BLD AUTO: 10.9 FL (ref 8.1–13.5)
POTASSIUM SERPL-SCNC: 4.1 MMOL/L (ref 3.7–5.3)
RBC # BLD: 5.76 M/UL (ref 3.95–5.11)
RBC # BLD: ABNORMAL 10*6/UL
SEG NEUTROPHILS: 69 % (ref 36–65)
SEGMENTED NEUTROPHILS ABSOLUTE COUNT: 7.29 K/UL (ref 1.5–8.1)
SODIUM BLD-SCNC: 145 MMOL/L (ref 135–144)
TOTAL PROTEIN: 7.5 G/DL (ref 6.4–8.3)
TRIGL SERPL-MCNC: 182 MG/DL
TSH SERPL DL<=0.05 MIU/L-ACNC: 0.95 MIU/L (ref 0.3–5)
VITAMIN B-12: 399 PG/ML (ref 232–1245)
VITAMIN D 25-HYDROXY: 29 NG/ML (ref 30–100)
VLDLC SERPL CALC-MCNC: ABNORMAL MG/DL (ref 1–30)
WBC # BLD: 10.5 K/UL (ref 3.5–11.3)
WBC # BLD: ABNORMAL 10*3/UL

## 2021-08-23 PROCEDURE — 73552 X-RAY EXAM OF FEMUR 2/>: CPT

## 2021-08-23 PROCEDURE — 77080 DXA BONE DENSITY AXIAL: CPT

## 2021-08-24 LAB — T4 TOTAL: 8.9 UG/DL (ref 4.5–10.9)

## 2021-08-25 PROBLEM — S12.201A CLOSED NONDISPLACED FRACTURE OF THIRD CERVICAL VERTEBRA (HCC): Status: ACTIVE | Noted: 2021-05-09

## 2021-08-25 PROBLEM — R13.12 DYSPHAGIA, OROPHARYNGEAL PHASE: Status: ACTIVE | Noted: 2021-05-17

## 2021-08-25 PROBLEM — N39.0 RECURRENT UTI: Status: ACTIVE | Noted: 2017-08-07

## 2021-08-25 PROBLEM — N32.81 OVERACTIVE BLADDER: Status: ACTIVE | Noted: 2021-05-14

## 2021-08-25 PROBLEM — I69.354 HEMIPLEGIA AND HEMIPARESIS FOLLOWING CEREBRAL INFARCTION AFFECTING LEFT NON-DOMINANT SIDE (HCC): Status: ACTIVE | Noted: 2021-05-14

## 2021-08-25 PROBLEM — I69.322 DYSARTHRIA FOLLOWING CEREBRAL INFARCTION: Status: ACTIVE | Noted: 2021-05-17

## 2021-08-25 PROBLEM — I13.0 HYPERTENSIVE HEART AND CHRONIC KIDNEY DISEASE WITH HEART FAILURE AND STAGE 1 THROUGH STAGE 4 CHRONIC KIDNEY DISEASE, OR UNSPECIFIED CHRONIC KIDNEY DISEASE (HCC): Status: ACTIVE | Noted: 2021-05-14

## 2021-08-25 PROBLEM — I25.10 ATHEROSCLEROTIC HEART DISEASE OF NATIVE CORONARY ARTERY WITHOUT ANGINA PECTORIS: Status: ACTIVE | Noted: 2021-05-14

## 2021-09-18 DIAGNOSIS — G81.14 SPASTIC HEMIPLEGIA AFFECTING LEFT NONDOMINANT SIDE, UNSPECIFIED ETIOLOGY (HCC): ICD-10-CM

## 2021-09-20 RX ORDER — BACLOFEN 10 MG/1
TABLET ORAL
Qty: 270 TABLET | Refills: 3 | Status: SHIPPED | OUTPATIENT
Start: 2021-09-20 | End: 2022-10-19

## 2021-09-20 NOTE — TELEPHONE ENCOUNTER
Pharmacy sent for refill of baclofen    Pt was last seen 09.24.20 by dr. Loya Shadow  She has been dealing with a broken foot/leg.       Has fu with Dr. Edilia Amezcua on 10.05.21 shortly before they leave for [place]

## 2021-10-05 ENCOUNTER — OFFICE VISIT (OUTPATIENT)
Dept: PHYSICAL MEDICINE AND REHAB | Age: 71
End: 2021-10-05
Payer: MEDICARE

## 2021-10-05 VITALS — HEIGHT: 64 IN | WEIGHT: 158 LBS | BODY MASS INDEX: 26.98 KG/M2 | TEMPERATURE: 97.5 F

## 2021-10-05 DIAGNOSIS — R26.9 GAIT ABNORMALITY: ICD-10-CM

## 2021-10-05 DIAGNOSIS — N31.9 NEUROGENIC BLADDER: ICD-10-CM

## 2021-10-05 DIAGNOSIS — S81.802S WOUND OF LEFT LOWER EXTREMITY, SEQUELA: ICD-10-CM

## 2021-10-05 DIAGNOSIS — M80.00XD AGE-RELATED OSTEOPOROSIS WITH CURRENT PATHOLOGICAL FRACTURE WITH ROUTINE HEALING, SUBSEQUENT ENCOUNTER: ICD-10-CM

## 2021-10-05 DIAGNOSIS — I89.0 LYMPHEDEMA: ICD-10-CM

## 2021-10-05 DIAGNOSIS — I69.954 SPASTIC HEMIPLEGIA OF LEFT NONDOMINANT SIDE AS LATE EFFECT OF CEREBROVASCULAR DISEASE, UNSPECIFIED CEREBROVASCULAR DISEASE TYPE (HCC): Primary | ICD-10-CM

## 2021-10-05 PROCEDURE — 3017F COLORECTAL CA SCREEN DOC REV: CPT | Performed by: PHYSICAL MEDICINE & REHABILITATION

## 2021-10-05 PROCEDURE — G8417 CALC BMI ABV UP PARAM F/U: HCPCS | Performed by: PHYSICAL MEDICINE & REHABILITATION

## 2021-10-05 PROCEDURE — 1123F ACP DISCUSS/DSCN MKR DOCD: CPT | Performed by: PHYSICAL MEDICINE & REHABILITATION

## 2021-10-05 PROCEDURE — 99214 OFFICE O/P EST MOD 30 MIN: CPT | Performed by: PHYSICAL MEDICINE & REHABILITATION

## 2021-10-05 PROCEDURE — G8484 FLU IMMUNIZE NO ADMIN: HCPCS | Performed by: PHYSICAL MEDICINE & REHABILITATION

## 2021-10-05 PROCEDURE — 4040F PNEUMOC VAC/ADMIN/RCVD: CPT | Performed by: PHYSICAL MEDICINE & REHABILITATION

## 2021-10-05 PROCEDURE — 1090F PRES/ABSN URINE INCON ASSESS: CPT | Performed by: PHYSICAL MEDICINE & REHABILITATION

## 2021-10-05 PROCEDURE — 1036F TOBACCO NON-USER: CPT | Performed by: PHYSICAL MEDICINE & REHABILITATION

## 2021-10-05 PROCEDURE — G8427 DOCREV CUR MEDS BY ELIG CLIN: HCPCS | Performed by: PHYSICAL MEDICINE & REHABILITATION

## 2021-10-05 PROCEDURE — G8399 PT W/DXA RESULTS DOCUMENT: HCPCS | Performed by: PHYSICAL MEDICINE & REHABILITATION

## 2021-10-05 NOTE — LETTER
[facility]  [facility] PHYSICAL MEDICINE AND REHABILITATION  [address] [address]  Dept: [phone]  Dept Fax: [phone]      10/6/21    Patient: Delon West  YOB: 1950    Dear  Shahnaz Medel MD ,    I had the pleasure of seeing your patient, Delon West today in the office today. Below are the relevant portions of my assessment and plan of care. IMPRESSION:   Diagnosis Orders   1. Spastic hemiplegia of left nondominant side as late effect of cerebrovascular disease, unspecified cerebrovascular disease type (HCC)     2. Gait abnormality     3. Neurogenic bladder     4. Lymphedema     5. Wound of left lower extremity, sequela     6. Age-related osteoporosis with current pathological fracture with routine healing, subsequent encounter       PLAN:  1.. Spastic left hemiplegiaUnable to increase baclofen due to sedationcontinue 10 mg 3 times a day. At this point they do not want to pursue further Botox currently. They will consider in the future. The left upper extremity appears a little bit more corrected. Left lower extremity currently with boot and wound. They plan to go to [place] in 1 week. They will talk to provider do Botox down there to re-evaluate on follow-up. Advised to continue brace for the hand and range of motion to prevent further contracture of the fingers and hand. Continue hygiene. Continue range of motion left leg. Wear boot as recommended by Ortho   2. History of lower extremity swellingcontinue for p.o. if okay with wound care  3. Regarding her pain, she notes minimal pain, rare use of pain medication . Lab work  was reviewed  they have begun using CBD oilreviewed concerns and no FDA regulation and unaware of interactions with other medications and long-term effects. They plan to continue as they feel her pain is better with thisthey are aware of the risks and concerns.   4.  Continue with Klonopin as needed - She'll also continue with baclofen 10 mg 3 times a day with occasional extra dose as needed. At this point have recommend continue current Klonopin dose 1 mg however recommend follow-up with neurology due to possible seizure and consideration of slow taper if okay with neurology  5. Continue follow with urologycontinue trospium, he is concerned that this be related to seizures. .  They will discuss with urologist.  6.  Left femur fracture has healed, left tib-fib fracture healingcontinue to follow with orthopedics, use boot as per orthopedics. They plan to follow with orthopedics in [facility]  7. Recommend continued follow with urology ,neurology and fall precautions were reviewed with the patient  8. Left lower extremity plantar contractureunable to evaluate as patient in boot. Recommend continue range of motion. 9.  Left leg wound. Recommend follow-up with wound care, monitor closely. 10.  Follow-up in 3 months or when they return from [place]. Earlier if necessary. Handicap form filled out for [place]. 35 minutes spent with patient and . No orders of the defined types were placed in this encounter. Thank you for allowing me to participate in the care of this patient. I will keep you updated on this patient's follow up and I look forward to serving you and your patients again in the future. Darrian Valdez. Luan Stein MD

## 2021-10-07 NOTE — PROGRESS NOTES
[facility] PHYSICAL MEDICINE AND REHABILITATION  [id number]  Dept: [phone]  Dept Fax: [phone]    Outpatient Follow up Note    Kenneth Rain, 66 y.o., female, presents for follow up for Botox for left spastic hemiparesis    Chief Complaint   Patient presents with    Other     broken leg x 2    . Patient was last seen on September 2019 and September 2020 by Dr. Herbie Hathaway    HPI:   Patient comes in follow-up with her . Since last evaluation they have gone to [place] to stay due on a yearly basis for 6 months. Last December while getting out of shower she fractured her left femur and went to [facility] clinic in [place]. They decided to treat conservatively with a knee brace. Was healing well. Unfortunately she again tripped and may of 2021 and ended up having left tib-fib fracture. She had surgery with screws in place by Dr. Francis Moreno. She notes I just saw him recently notes things are healing well. Postop she was at [facility] followed by Dr. Lázaro Thao. She was wearing a brace regular unfortunately developed a wound. She is follow-up by wound care clinic at [facility].  They missed the last appointment last week however talk to her nurse and notes things are healing well. They note that  this is healing well per the patient. She wears a boot when not at home. She also uses a Lymphapress. She is also followed a family doctor and had a DEXA scan done - diagnosed with osteoporosis she was planning on starting monthly injections in [place]. She also attempted to wean off Klonopin but noted had a seizure. Her  notes that she had been on Klonopin for spasms. They try slowly weaning this off. They attempted weaning this off slowly unfortunately developed a seizure when this was discontinued. They resumed 1 mg Klonopin. He questions whether some of this may be dueTrospium. used for bladder.   She plans to follow-up with urology in [facility] clinic in [place]. Regarding spasms-continues on baclofen and Klonopin. Recent lab work has been okay. She still notes some tightness in the left proximal leg and left hand. They are attempting where a brace and  notes he is able to range the fingers. Other    Last Botox injection was 9/6/2019 9/6/2019. She and her  noted  improvement with Botox to left lower extremity. She noted significant decreased pain, increased range of motion and improve range. He feels transfers are even easier. She is pleased with the progress. Pain is under control, however they using CBD oil droplets and notes that this has improved her pain. She has seen vascular Dr. Nithya Patiño and has been referred to the lymphedema clinic which she has been going 3 times a week with wraps and pump. She also purchase this for home. She no swelling in her legs has significantly improved. . Venous Dopplers left upper extremity was negative. If swelling persists-recommend follow-up with vascular    She continues a brace to the left lower extremity- she has obtained a new strap and notes brace is fitting well. Bladder-she uses trospium and follows up with Dr. Stephanie Mack. He started to have a small sacral wounds which have now healed. She has been seen by wound care clinic. She now has a cushion. They go to [place] -winter and have physicians there as well. Venous Doppler 6/14/18  RIGHT:    No evidence of superficial or deep venous thrombosis in the lower    extremity.  LEFT:    No evidence of superficial or deep venous thrombosis in the lower    extremity. Multilevel edema noted. History  She is a 50-year-old female. 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 She actually initially had a stroke in January 2013 but had nearly full recovery. However, she then had another large stroke.   She has been seeing Dr. Priya Bernard for the last 2 and half years getting Botox injection about every 3 months in the left upper and left lower extremity. They note however, that last injection there was not as much improvement as previously. She also received some serial casting. She continues with a left ankle-foot orthoses and a left upper extremity resting splint. She is using Botox for spasticity as well as pain management.     She  on 2015 by Dr. Emilee Zarate in which he used 400 units to inject the medial and lateral gastroc muscles, soleus muscle, biceps, flexor pollicis longus and flexor digitorum superficialis    Past Medical History:   Diagnosis Date    Anxiety     Coronary heart disease     Depression     Hypertension     Stroke due to intracerebral hemorrhage [facility])       Past Surgical History:   Procedure Laterality Date    OTHER SURGICAL HISTORY      botox injections from dr. Krystal Watts every 3 months       Family History   Problem Relation Age of Onset    Hypertension Mother     Stroke Mother        Social History     Tobacco Use    Smoking status: Former Smoker     Packs/day: 0.50     Years: 15.00     Pack years: 7.50     Types: Cigarettes     Quit date: 2013     Years since quittin.7    Smokeless tobacco: Never Used   Substance Use Topics    Alcohol use: No     Alcohol/week: 3.0 standard drinks     Types: 3 Standard drinks or equivalent per week        Current Outpatient Medications   Medication Sig Dispense Refill    baclofen (LIORESAL) 10 MG tablet TAKE 1 TABLET THREE TIMES DAILY 270 tablet 3    atorvastatin (LIPITOR) 20 MG tablet TAKE 1 TABLET EVERY DAY 90 tablet 2    lisinopril (PRINIVIL;ZESTRIL) 10 MG tablet TAKE 1 TABLET EVERY DAY 90 tablet 2    Cholecalciferol (VITAMIN D3) 50 MCG (2000 UT) CAPS Take by mouth      polyethylene glycol (GLYCOLAX) 17 GM/SCOOP powder Take 17 g by mouth daily as needed       aspirin 325 MG tablet Take 325 mg by mouth daily      acetaminophen (TYLENOL) 500 MG tablet Take 1,000 mg by mouth      oxyCODONE-acetaminophen (PERCOCET) 5-325 MG per tablet Take 1 tablet by mouth daily as needed for Pain. (Patient not taking: Reported on 10/5/2021)      clonazePAM (KLONOPIN) 0.5 MG tablet Take 1 tablet by mouth 2 times daily as needed for Anxiety (spasticity) for up to 90 days. 180 tablet 0    trospium (SANCTURA) 20 MG tablet Take 20 mg by mouth 2 times daily (Patient not taking: Reported on 10/5/2021)      NONFORMULARY Pt.  reports using CBD OIL for pt pain       No current facility-administered medications for this visit. Allergies   Allergen Reactions    Penicillins Swelling and Hives    Imipramine     Metoprolol Other (See Comments)     Drops blood pressure too low         Subjective:      Review of Systems  CONSTITUTIONAL: Negative for fever, chills, sweat, fatigue and unexpected weight change. HEENT:  Negative for diplopia, blind spots, blurred vision, hearing loss, trouble chewing or swallowing, denies coughing while eating or drinking denies nosebleed    RESPIRATORY: Negative for wheezing, coughing or shortness of breath    CARDIOVASCULAR: Negative for chest pain, palpitations, lightheadedness  GASTROINTESTINAL: Negative for heartburn, nausea, constipation, diarrhea, abdominal pain  or incontinence  GENTIOURNIARY: Negative for dysuria, urgency, frequency, incontinence and difficulty urinating. ENDOCRINE: Negative for hot or cold intolerance, denies any significant weight change  MUSCULOSKELETAL: Negative for , back pain, neck pain and arthralgias.   spasticity left upper and lower extremity-  NUEROLOGIIC: Negative for focal weakness, numbness, tingling, +balance loss, headaches or falls  BEHAVIOR/PSYCY: Denies depression, anxiety, memory loss or insomnia  SKIN: Denies ulcers, rashes or bruises         Objective:     Physical Exam  Temp 97.5 °F (36.4 °C)   Ht 5' 4\" (1.626 m)   Wt 158 lb (71.7 kg)   BMI 27.12 kg/m²     Nursing notes and vitals reviewed    CONSTITUTIONAL: She is oriented to person, place, and time. She appears well-developed and well-nourished. HEENT: Pupils equal reactive to light, exact motion intact, visual fields are full, mild left facial asymmetry, speech fluent, mild dysarthria  comprehension intact, object naming intact, repetition intact, basic cognition intact  CARDIOVASCULAR: Heart regular rate and rhythm with no murmurs rubs or gallops   PULMONARY/CHEST: Clear to auscultation with no wheezes or crackles noted  ABDOMEN: Soft, nontender with positive bowel sounds, no guarding or rebound   NEUROLOGIC:    Orientation: Alert and oriented ,    cranial nerves:  II through XII are intact,   Strength:5 out of 5 throughout Right upper and lower extremities   Sensation: Intact to light touch and pinprick to right   Balance: I the wheelchair, ambulation and balance was not tested   Examination of left upper and lower extremities reveal left forearm- -tightness of the hand and fingers, difficulty ranging fingers and thumb, difficulty ranging,  with decreased range of motion elbow to 30° extension lag and decreased range of motion of the shoulder with no change. Left lower extremity in boot. EXTREMITIES: No calf tenderness, negative Homans, negative warmth, pulses are intact, edema of the right lower extremities     SKIN: Skin is warm and dry. Attempt to evaluate wound-however they prefer not to take the dressing or boot off. PSYCIATIRIC: normal mood and affect, behavior is normal. Thought content normal.         Assessment:       Diagnosis Orders   1. Spastic hemiplegia of left nondominant side as late effect of cerebrovascular disease, unspecified cerebrovascular disease type (HCC)     2. Gait abnormality     3. Neurogenic bladder     4. Lymphedema     5. Wound of left lower extremity, sequela     6.  Age-related osteoporosis with current pathological fracture with routine healing, subsequent encounter          Plan: 1.. Spastic left hemiplegia-Unable to increase baclofen due to sedation-continue 10 mg 3 times a day. At this point they do not want to pursue further Botox currently. They will consider in the future. The left upper extremity appears a little bit more corrected. Left lower extremity currently with boot and wound. There plan to go to [place] in 1 week. They will talk to possibly do Botox on there or reevaluate on follow-up. Advised to continue brace for the hand and range of motion prevent further contracture of the fingers and hand. Continue hygiene. Continue range of motion left leg. Wear boot as recommended by Ortho and wound  2. History of lower extremity swelling-continue for p.o. if okay with wound care  3. Regarding her pain, -she notes minimal pain, rare use of pain medication . Lab work  was reviewed  they have begun using CBD oil-reviewed concerns and no FDA regulation and unaware of interactions with other medications and long-term effects. They plan to continue as they feel her pain is better with this-they are aware of the risks and concerns. 4.  Continue with Klonopin as needed - She'll also continue with baclofen 10 mg 3 times a day with occasional extra dose as needed. At this point have recommend continue current Klonopin dose 1 mg however recommend follow-up with neurology due to possible seizure and consideration of slow taper if okay with neurology  5. Continue follow with urology-continue trospium, he is concerned that this be related to seizures. .  They will discuss with urologist.  6.  Left femur fracture has healed, left tib-fib fracture healing-continue to follow with orthopedics, use boot as per orthopedics. They plan to follow with orthopedics in [facility] clinic  7. Recommend continued follow with urology ,neurology and fall precautions were reviewed with the patient  8. Left lower extremity plantar contracture--unable to evaluate as patient in boot. Recommend continue range of motion. 9.  Left leg wound. Recommend follow-up with wound care, monitor closely. 10.  Follow-up in 3 months or when they return from [place]. Earlier if necessary. Handicap form filled out for [place]. 35 minutes spent with patient and . No orders of the defined types were placed in this encounter. Electronically signed by Nadira Garcias. Tiarra Landis MD on 10/6/2021 at 9:16 PM    Please note that this chart was generated using voice recognition Dragon dictation software. Although every effort was made to ensure the accuracy of this automated transcription, some errors in transcription may have occurred.

## 2021-10-25 DIAGNOSIS — R26.9 GAIT ABNORMALITY: ICD-10-CM

## 2021-10-25 DIAGNOSIS — I69.954 SPASTIC HEMIPLEGIA OF LEFT NONDOMINANT SIDE AS LATE EFFECT OF CEREBROVASCULAR DISEASE, UNSPECIFIED CEREBROVASCULAR DISEASE TYPE (HCC): ICD-10-CM

## 2021-10-25 RX ORDER — CLONAZEPAM 0.5 MG/1
0.5 TABLET ORAL 2 TIMES DAILY PRN
Qty: 180 TABLET | Refills: 0 | Status: CANCELLED | OUTPATIENT
Start: 2021-10-25 | End: 2022-01-23

## 2021-10-25 NOTE — TELEPHONE ENCOUNTER
Mail order pharmacy sent an order for refill of clonazepam 0.5 mg     This is for 90 day supply- add refills if desired as patient is in [facility] for the winter months.

## 2022-05-26 DIAGNOSIS — R26.9 GAIT ABNORMALITY: ICD-10-CM

## 2022-05-26 DIAGNOSIS — I69.954 SPASTIC HEMIPLEGIA OF LEFT NONDOMINANT SIDE AS LATE EFFECT OF CEREBROVASCULAR DISEASE, UNSPECIFIED CEREBROVASCULAR DISEASE TYPE (HCC): ICD-10-CM

## 2022-05-26 RX ORDER — CLONAZEPAM 0.5 MG/1
0.5 TABLET ORAL DAILY
Qty: 90 TABLET | Refills: 0 | Status: SHIPPED | OUTPATIENT
Start: 2022-05-26 | End: 2022-08-24

## 2022-05-26 NOTE — TELEPHONE ENCOUNTER
Pharmacy requesting refill of Klonopin. Last filled 03/17/2022. Last seen 10/05/2021. Follow-up not scheduled. We will give them a call to see if they have returned from [place] so we can make an appointment.

## 2022-05-26 NOTE — TELEPHONE ENCOUNTER
Spoke with . They are hoping to be moved to [place] permanently by the end of the year. They do not have a neurologist in [address] or [place].  states that they have tried to discontinue the klonopin but she could not tolerate stating that he thinks it caused a seizure each time he has tried to stop. Apparently,  When she fell awhile ago and was in the hospital when she broke her foot or something they forgot to give the clonopin and she had a seizure. Same happened when she was home. She is taking 0.5 mg once a day and has not had a seizure since. She is scheduled for followup with Dr. Leatha Epley in August (that was soonest to get her in for the [place] office. She does need refill of this medication.   He would like to keep her on this and when he moves to [place],  He will talk with the primary down there and try to find a neurologist.

## 2022-05-30 PROBLEM — I10 PRIMARY HYPERTENSION: Status: ACTIVE | Noted: 2022-05-30

## 2022-06-13 ENCOUNTER — TELEPHONE (OUTPATIENT)
Dept: INFUSION THERAPY | Facility: MEDICAL CENTER | Age: 72
End: 2022-06-13

## 2022-06-13 NOTE — TELEPHONE ENCOUNTER
New order 6/10/22 Dr. Ally Corcoran 60mg/ml SC injection once, 0 refill  BMP ordered  Kardex updated, chart to front office for scheduling.

## 2022-06-22 ENCOUNTER — HOSPITAL ENCOUNTER (OUTPATIENT)
Facility: MEDICAL CENTER | Age: 72
Discharge: HOME | End: 2022-06-22
Payer: MEDICARE

## 2022-06-22 ENCOUNTER — HOSPITAL ENCOUNTER (OUTPATIENT)
Dept: INFUSION THERAPY | Facility: MEDICAL CENTER | Age: 72
Discharge: HOME | End: 2022-06-22
Payer: MEDICARE

## 2022-06-22 VITALS
SYSTOLIC BLOOD PRESSURE: 148 MMHG | RESPIRATION RATE: 18 BRPM | DIASTOLIC BLOOD PRESSURE: 89 MMHG | TEMPERATURE: 97.9 F | HEART RATE: 91 BPM

## 2022-06-22 DIAGNOSIS — Z01.812 BLOOD TESTS PRIOR TO TREATMENT OR PROCEDURE: ICD-10-CM

## 2022-06-22 DIAGNOSIS — M81.0 OSTEOPOROSIS, UNSPECIFIED OSTEOPOROSIS TYPE, UNSPECIFIED PATHOLOGICAL FRACTURE PRESENCE: Primary | ICD-10-CM

## 2022-06-22 LAB
ANION GAP SERPL CALCULATED.3IONS-SCNC: 11 MMOL/L (ref 9–17)
BUN BLDV-MCNC: 31 MG/DL (ref 8–23)
BUN/CREAT BLD: 39 (ref 9–20)
CALCIUM SERPL-MCNC: 11.1 MG/DL (ref 8.6–10.4)
CHLORIDE BLD-SCNC: 108 MMOL/L (ref 98–107)
CO2: 27 MMOL/L (ref 20–31)
CREAT SERPL-MCNC: 0.79 MG/DL (ref 0.5–0.9)
GFR AFRICAN AMERICAN: >60 ML/MIN
GFR NON-AFRICAN AMERICAN: >60 ML/MIN
GFR SERPL CREATININE-BSD FRML MDRD: ABNORMAL ML/MIN/{1.73_M2}
GLUCOSE BLD-MCNC: 88 MG/DL (ref 70–99)
POTASSIUM SERPL-SCNC: 3.7 MMOL/L (ref 3.7–5.3)
SODIUM BLD-SCNC: 146 MMOL/L (ref 135–144)

## 2022-06-22 PROCEDURE — 96372 THER/PROPH/DIAG INJ SC/IM: CPT

## 2022-06-22 PROCEDURE — 36415 COLL VENOUS BLD VENIPUNCTURE: CPT

## 2022-06-22 PROCEDURE — 6360000002 HC RX W HCPCS: Performed by: FAMILY MEDICINE

## 2022-06-22 PROCEDURE — 80048 BASIC METABOLIC PNL TOTAL CA: CPT

## 2022-06-22 RX ADMIN — DENOSUMAB 60 MG: 60 INJECTION SUBCUTANEOUS at 16:00

## 2022-06-22 ASSESSMENT — PAIN SCALES - GENERAL: PAINLEVEL_OUTOF10: 8

## 2022-06-22 NOTE — PROGRESS NOTES
Patient arrive via wheelchair with spouse for Prolia injection. Denies complaint or concern. No dental or jaw issues; has follow up with dentist soon; informed them to be certain dentist aware of this new drug being added to her treatment/care plan. Spouse has already reviewed/researched new medication; provided further education today with both verbal and written drug info form [facility] website. Labs reviewed (2 calls by writer to laboratory to obtain lab result as patient continues to wait). Patient tolerate injection well; band-aid applied. Patient off unit with spouse via wheelchair at discharge.

## 2022-06-23 DIAGNOSIS — G81.14 SPASTIC HEMIPLEGIA OF LEFT NONDOMINANT SIDE DUE TO NONCEREBROVASCULAR ETIOLOGY (HCC): Primary | ICD-10-CM

## 2022-10-04 DIAGNOSIS — G81.14 SPASTIC HEMIPLEGIA AFFECTING LEFT NONDOMINANT SIDE, UNSPECIFIED ETIOLOGY (HCC): ICD-10-CM

## 2022-10-04 NOTE — TELEPHONE ENCOUNTER
Pharmacy sent for refill of baclofen. 90 day supply with refills. This is a mail order pharmacy. Pt was last seen 10.06.2021.   Next appt for video visit (already in [place]) on 10.11.22

## 2022-10-10 LAB
A/G RATIO: 1.8
ALBUMIN SERPL-MCNC: 4.2 G/DL
ALP BLD-CCNC: 122 U/L
ALT SERPL-CCNC: 9 U/L
AST SERPL-CCNC: 13 U/L
BILIRUB SERPL-MCNC: 0.6 MG/DL (ref 0.1–1.4)
BILIRUBIN DIRECT: NORMAL
BILIRUBIN, INDIRECT: NORMAL
GLOBULIN: 2.4
PROTEIN TOTAL: 6.6 G/DL

## 2022-10-11 ENCOUNTER — TELEMEDICINE (OUTPATIENT)
Dept: PHYSICAL MEDICINE AND REHAB | Age: 72
End: 2022-10-11

## 2022-10-11 DIAGNOSIS — I69.322 DYSARTHRIA FOLLOWING CEREBRAL INFARCTION: ICD-10-CM

## 2022-10-11 DIAGNOSIS — N31.9 NEUROGENIC BLADDER: ICD-10-CM

## 2022-10-11 DIAGNOSIS — I87.2 VENOUS INSUFFICIENCY (CHRONIC) (PERIPHERAL): ICD-10-CM

## 2022-10-11 DIAGNOSIS — R26.9 GAIT ABNORMALITY: ICD-10-CM

## 2022-10-11 DIAGNOSIS — N32.81 OVERACTIVE BLADDER: ICD-10-CM

## 2022-10-11 DIAGNOSIS — M81.0 OSTEOPOROSIS WITHOUT CURRENT PATHOLOGICAL FRACTURE, UNSPECIFIED OSTEOPOROSIS TYPE: ICD-10-CM

## 2022-10-11 DIAGNOSIS — I10 PRIMARY HYPERTENSION: ICD-10-CM

## 2022-10-11 DIAGNOSIS — Z87.898 HISTORY OF SEIZURE: ICD-10-CM

## 2022-10-11 DIAGNOSIS — G81.14 SPASTIC HEMIPLEGIA OF LEFT NONDOMINANT SIDE DUE TO NONCEREBROVASCULAR ETIOLOGY (HCC): Primary | ICD-10-CM

## 2022-10-11 DIAGNOSIS — I69.359: ICD-10-CM

## 2022-10-11 DIAGNOSIS — I89.0 LYMPHEDEMA: ICD-10-CM

## 2022-10-19 RX ORDER — BACLOFEN 10 MG/1
TABLET ORAL
Qty: 270 TABLET | Refills: 2 | Status: SHIPPED | OUTPATIENT
Start: 2022-10-19

## 2022-10-25 DIAGNOSIS — G81.14 SPASTIC HEMIPLEGIA OF LEFT NONDOMINANT SIDE DUE TO NONCEREBROVASCULAR ETIOLOGY (HCC): ICD-10-CM

## 2022-11-30 DIAGNOSIS — R26.9 GAIT ABNORMALITY: ICD-10-CM

## 2022-11-30 DIAGNOSIS — I69.954 SPASTIC HEMIPLEGIA OF LEFT NONDOMINANT SIDE AS LATE EFFECT OF CEREBROVASCULAR DISEASE, UNSPECIFIED CEREBROVASCULAR DISEASE TYPE (HCC): ICD-10-CM

## 2022-11-30 NOTE — TELEPHONE ENCOUNTER
Kerri Awan, , called to ask for refill of clonazepam.    He is asking for a 90 day supply because of using mail order pharmacy. Pt was last seen 10.11.22 by televisit. Fu wont be until spring when they return from [place].

## 2022-12-01 RX ORDER — CLONAZEPAM 0.5 MG/1
0.5 TABLET ORAL DAILY
Qty: 90 TABLET | Refills: 2 | OUTPATIENT
Start: 2022-12-01 | End: 2023-08-28

## 2022-12-01 RX ORDER — FUROSEMIDE 20 MG/1
TABLET ORAL
COMMUNITY
Start: 2022-10-04

## 2022-12-01 RX ORDER — SULFAMETHOXAZOLE AND TRIMETHOPRIM 400; 80 MG/1; MG/1
TABLET ORAL
COMMUNITY
Start: 2022-10-04

## 2022-12-01 RX ORDER — CLONAZEPAM 0.5 MG/1
TABLET ORAL
Qty: 90 TABLET | Refills: 0 | Status: SHIPPED | OUTPATIENT
Start: 2022-12-01 | End: 2023-03-01

## 2022-12-01 NOTE — TELEPHONE ENCOUNTER
[facility] has sent a request for refill of this medication and I refused because pt wants it through the mail order since they are in [place]

## 2022-12-01 NOTE — TELEPHONE ENCOUNTER
Spoke with Mr. Gerhard Lamar and she is taking every night before before bed. He has not found a neurologist yet. He is working on finding physicians down there for her.

## 2022-12-20 ENCOUNTER — HOSPITAL ENCOUNTER (OUTPATIENT)
Facility: MEDICAL CENTER | Age: 72
End: 2022-12-20

## 2022-12-22 ENCOUNTER — HOSPITAL ENCOUNTER (OUTPATIENT)
Dept: INFUSION THERAPY | Facility: MEDICAL CENTER | Age: 72
Discharge: HOME | End: 2022-12-22

## 2022-12-27 ENCOUNTER — TELEPHONE (OUTPATIENT)
Dept: ONCOLOGY | Age: 72
End: 2022-12-27

## 2022-12-27 NOTE — TELEPHONE ENCOUNTER
New order noted 12/20/22  per Dr. J Carlos Denise 60mg/ml SC injection once, 0 refill  BMP ordered      Chart to  to be scanned and processed.

## 2023-11-07 ENCOUNTER — TELEPHONE (OUTPATIENT)
Dept: PHYSICAL MEDICINE AND REHAB | Age: 73
End: 2023-11-07

## 2023-11-07 DIAGNOSIS — G81.14 SPASTIC HEMIPLEGIA AFFECTING LEFT NONDOMINANT SIDE, UNSPECIFIED ETIOLOGY (HCC): ICD-10-CM

## 2023-11-07 DIAGNOSIS — G81.14 SPASTIC HEMIPLEGIA AFFECTING LEFT NONDOMINANT SIDE, UNSPECIFIED ETIOLOGY (HCC): Primary | ICD-10-CM

## 2023-11-07 DIAGNOSIS — Z79.899 MEDICATION MANAGEMENT: ICD-10-CM

## 2023-11-07 NOTE — TELEPHONE ENCOUNTER
I spoke with . They are already in [place] and will not be back until the spring. Last 2 appts in the summer were missed because pt was sick. They are currently looking for physicians in [place] but have not found any that will take her.

## 2023-11-07 NOTE — TELEPHONE ENCOUNTER
Pt missed last 2 appts due to being sick and she is already in [place] for winter.  Will not be back until may/June.    Actively looking for physicians to handle her care in [place]

## 2023-11-07 NOTE — TELEPHONE ENCOUNTER
Pt is requesting a refill of her Baclofen and has not been in since 10/2022. Is there a spot you would want her squeezed in to?  Okay to refill until her appt at least?

## 2023-11-08 NOTE — TELEPHONE ENCOUNTER
Spoke with Nguyen Sesay, told him that lab work needed to be completed first.  Once we receive, then dr. Samina Ford will renew rx for the baclofen

## 2023-11-21 RX ORDER — BACLOFEN 10 MG/1
TABLET ORAL
Qty: 270 TABLET | Refills: 10 | OUTPATIENT
Start: 2023-11-21

## 2024-02-06 ENCOUNTER — TELEPHONE (OUTPATIENT)
Dept: PHYSICAL MEDICINE AND REHAB | Age: 74
End: 2024-02-06

## 2024-02-06 NOTE — TELEPHONE ENCOUNTER
I spoke with Han, pt , they will be back in town at the end of April 2024.  He will get her lab work completed when he gets back to [place].   Apparently it takes months to get lab work completed down there (why?).      They also do not have an appt set up for fu with Dr. Mathews.  Will have to get them in when they return from [place].      The lab work that was scanned was from her wound care dr in [place] and Han thought they were checking everything(?)

## 2024-02-07 NOTE — TELEPHONE ENCOUNTER
Fu appt scheduled for June.  On wait list for when they return from [place].  Also, she was out of the medication that Dr. Mathews prescribed but she has plenty to last until she returns because she is taking her husbands that he no longer takes.  Same dosage.   Han, , states that she will be able to wait until appt and getting lab work completed when they return at the end of April

## 2024-02-08 NOTE — TELEPHONE ENCOUNTER
I advised him of this but he is not going to do labs until he gets back to [place] at the end of april

## 2024-06-20 ENCOUNTER — OFFICE VISIT (OUTPATIENT)
Dept: PHYSICAL MEDICINE AND REHAB | Age: 74
End: 2024-06-20
Payer: MEDICARE

## 2024-06-20 VITALS
BODY MASS INDEX: 26.78 KG/M2 | DIASTOLIC BLOOD PRESSURE: 98 MMHG | TEMPERATURE: 96.4 F | HEIGHT: 64 IN | SYSTOLIC BLOOD PRESSURE: 158 MMHG

## 2024-06-20 DIAGNOSIS — G81.14 SPASTIC HEMIPLEGIA AFFECTING LEFT NONDOMINANT SIDE, UNSPECIFIED ETIOLOGY (HCC): ICD-10-CM

## 2024-06-20 DIAGNOSIS — M81.0 OSTEOPOROSIS WITHOUT CURRENT PATHOLOGICAL FRACTURE, UNSPECIFIED OSTEOPOROSIS TYPE: ICD-10-CM

## 2024-06-20 DIAGNOSIS — N31.9 NEUROGENIC BLADDER: ICD-10-CM

## 2024-06-20 DIAGNOSIS — Z87.898 HISTORY OF SEIZURE: ICD-10-CM

## 2024-06-20 DIAGNOSIS — I10 PRIMARY HYPERTENSION: ICD-10-CM

## 2024-06-20 DIAGNOSIS — I89.0 LYMPHEDEMA: ICD-10-CM

## 2024-06-20 DIAGNOSIS — I69.322 DYSARTHRIA FOLLOWING CEREBRAL INFARCTION: ICD-10-CM

## 2024-06-20 DIAGNOSIS — N32.81 OVERACTIVE BLADDER: ICD-10-CM

## 2024-06-20 DIAGNOSIS — Z79.899 MEDICATION MANAGEMENT: ICD-10-CM

## 2024-06-20 DIAGNOSIS — R26.9 GAIT ABNORMALITY: Primary | ICD-10-CM

## 2024-06-20 DIAGNOSIS — I69.359: ICD-10-CM

## 2024-06-20 PROCEDURE — 3017F COLORECTAL CA SCREEN DOC REV: CPT | Performed by: PHYSICAL MEDICINE & REHABILITATION

## 2024-06-20 PROCEDURE — G8427 DOCREV CUR MEDS BY ELIG CLIN: HCPCS | Performed by: PHYSICAL MEDICINE & REHABILITATION

## 2024-06-20 PROCEDURE — G8399 PT W/DXA RESULTS DOCUMENT: HCPCS | Performed by: PHYSICAL MEDICINE & REHABILITATION

## 2024-06-20 PROCEDURE — 99214 OFFICE O/P EST MOD 30 MIN: CPT | Performed by: PHYSICAL MEDICINE & REHABILITATION

## 2024-06-20 PROCEDURE — 3077F SYST BP >= 140 MM HG: CPT | Performed by: PHYSICAL MEDICINE & REHABILITATION

## 2024-06-20 PROCEDURE — 3080F DIAST BP >= 90 MM HG: CPT | Performed by: PHYSICAL MEDICINE & REHABILITATION

## 2024-06-20 PROCEDURE — G8419 CALC BMI OUT NRM PARAM NOF/U: HCPCS | Performed by: PHYSICAL MEDICINE & REHABILITATION

## 2024-06-20 PROCEDURE — 4004F PT TOBACCO SCREEN RCVD TLK: CPT | Performed by: PHYSICAL MEDICINE & REHABILITATION

## 2024-06-20 PROCEDURE — 1090F PRES/ABSN URINE INCON ASSESS: CPT | Performed by: PHYSICAL MEDICINE & REHABILITATION

## 2024-06-20 PROCEDURE — 1123F ACP DISCUSS/DSCN MKR DOCD: CPT | Performed by: PHYSICAL MEDICINE & REHABILITATION

## 2024-06-20 RX ORDER — BACLOFEN 10 MG/1
10 TABLET ORAL 3 TIMES DAILY
Qty: 270 TABLET | Refills: 0 | Status: SHIPPED | OUTPATIENT
Start: 2024-06-20

## 2024-06-20 NOTE — PROGRESS NOTES
contractures.    Again advised to continue brace for the hand and range of motion prevent further contracture of the fingers and hand.  Continue hygiene.  Continue range of motion left leg with precautions per Ortho.  Wear boot as recommended by Ortho and wound.  Advised to resume physical and Occupational Therapy to work on range, transfers, bracing, etc.-they help to start this in [place]  2.  History of lower extremity swelling-continue lymphedema management.  She was advised to follow-up.  They states since moving to [place] they have already set up a facility where they will look into resuming the Lymphapress  3.  Regarding her pain, -she notes minimal pain, rare use of pain medication .  Lab work  was reviewed  they have begun using CBD oil-reviewed concerns and no FDA regulation and unaware of interactions with other medications and long-term effects.  They plan to continue as they feel her pain is better with this-they are aware of the risks and concerns.  4.  Patient is now off Klonopin for the last year, has had no seizures.- She'll also continue with baclofen 10 mg 3 times a day    5.  Continue follow with urology-she has discontinued trospium, they felt this was causing seizures.  Since discontinuation she has been seizure-free.  She continues with neurogenic bladder-advised to timed void and toilet.  They again plan to follow-up with urologist down in [place] again.  6.  Left femur fracture has healed, left tib-fib fracture healing-continue to follow with orthopedics as needed, patient also continues to use Voltaren gel for the knee which she feels is helping  7.  Recommend continued follow with urology ,neurology, lymphedema clinic, physiatrist, therapy and fall precautions were reviewed with the patient  8.  Left lower extremity plantar contracture--continue therapy, range of motion as able, bracing  9.  Left leg wound.  Recommend follow-up with wound care, patient and  note this is healed  10.

## 2024-06-27 ENCOUNTER — HOSPITAL ENCOUNTER (OUTPATIENT)
Age: 74
Setting detail: SPECIMEN
Discharge: HOME | End: 2024-06-27

## 2024-06-27 DIAGNOSIS — R79.89 ABNORMAL CBC: ICD-10-CM

## 2024-06-27 LAB
BASOPHILS # BLD: 0.08 K/UL (ref 0–0.2)
BASOPHILS NFR BLD: 1 % (ref 0–2)
EOSINOPHIL # BLD: 0.16 K/UL (ref 0–0.44)
EOSINOPHILS RELATIVE PERCENT: 1 % (ref 1–4)
ERYTHROCYTE [DISTWIDTH] IN BLOOD BY AUTOMATED COUNT: 13.2 % (ref 11.8–14.4)
HCT VFR BLD AUTO: 50.2 % (ref 36.3–47.1)
HGB BLD-MCNC: 15.7 G/DL (ref 11.9–15.1)
IMM GRANULOCYTES # BLD AUTO: <0.03 K/UL (ref 0–0.3)
IMM GRANULOCYTES NFR BLD: 0 %
LYMPHOCYTES NFR BLD: 1.4 K/UL (ref 1.1–3.7)
LYMPHOCYTES RELATIVE PERCENT: 12 % (ref 24–43)
MCH RBC QN AUTO: 30.4 PG (ref 25.2–33.5)
MCHC RBC AUTO-ENTMCNC: 31.3 G/DL (ref 28.4–34.8)
MCV RBC AUTO: 97.3 FL (ref 82.6–102.9)
MONOCYTES NFR BLD: 0.72 K/UL (ref 0.1–1.2)
MONOCYTES NFR BLD: 6 % (ref 3–12)
NEUTROPHILS NFR BLD: 80 % (ref 36–65)
NEUTS SEG NFR BLD: 9.59 K/UL (ref 1.5–8.1)
NRBC BLD-RTO: 0 PER 100 WBC
PLATELET # BLD AUTO: 321 K/UL (ref 138–453)
PMV BLD AUTO: 10.3 FL (ref 8.1–13.5)
RBC # BLD AUTO: 5.16 M/UL (ref 3.95–5.11)
WBC OTHER # BLD: 12 K/UL (ref 3.5–11.3)

## 2024-06-28 DIAGNOSIS — G81.14 SPASTIC HEMIPLEGIA AFFECTING LEFT NONDOMINANT SIDE, UNSPECIFIED ETIOLOGY (HCC): Primary | ICD-10-CM

## 2024-06-28 NOTE — PROGRESS NOTES
CBC noted and pt to f/u with PCP, ordered CMP to monitor - pt on baclofen. Marko WELLS to notify pt

## 2024-07-01 ENCOUNTER — HOSPITAL ENCOUNTER (OUTPATIENT)
Age: 74
Setting detail: SPECIMEN
Discharge: HOME | End: 2024-07-01

## 2024-07-01 DIAGNOSIS — R30.0 BURNING WITH URINATION: ICD-10-CM

## 2024-07-02 LAB
MICROORGANISM SPEC CULT: NORMAL
SERVICE CMNT-IMP: NORMAL
SPECIMEN DESCRIPTION: NORMAL